# Patient Record
Sex: FEMALE | Race: WHITE | NOT HISPANIC OR LATINO
[De-identification: names, ages, dates, MRNs, and addresses within clinical notes are randomized per-mention and may not be internally consistent; named-entity substitution may affect disease eponyms.]

---

## 2017-01-11 ENCOUNTER — APPOINTMENT (OUTPATIENT)
Dept: UROLOGY | Facility: CLINIC | Age: 67
End: 2017-01-11

## 2017-01-11 VITALS
WEIGHT: 153 LBS | TEMPERATURE: 98.1 F | RESPIRATION RATE: 15 BRPM | BODY MASS INDEX: 25.49 KG/M2 | DIASTOLIC BLOOD PRESSURE: 60 MMHG | HEART RATE: 84 BPM | SYSTOLIC BLOOD PRESSURE: 100 MMHG | HEIGHT: 65 IN

## 2017-02-21 ENCOUNTER — EMERGENCY (EMERGENCY)
Facility: HOSPITAL | Age: 67
LOS: 0 days | Discharge: HOME | End: 2017-02-21
Attending: EMERGENCY MEDICINE | Admitting: EMERGENCY MEDICINE
Payer: COMMERCIAL

## 2017-02-21 VITALS
HEART RATE: 86 BPM | OXYGEN SATURATION: 99 % | TEMPERATURE: 98 F | SYSTOLIC BLOOD PRESSURE: 151 MMHG | RESPIRATION RATE: 18 BRPM | HEIGHT: 65 IN | WEIGHT: 160.06 LBS | DIASTOLIC BLOOD PRESSURE: 72 MMHG

## 2017-02-21 DIAGNOSIS — M54.2 CERVICALGIA: ICD-10-CM

## 2017-02-21 DIAGNOSIS — M25.552 PAIN IN LEFT HIP: ICD-10-CM

## 2017-02-21 DIAGNOSIS — M54.9 DORSALGIA, UNSPECIFIED: ICD-10-CM

## 2017-02-21 PROCEDURE — 99283 EMERGENCY DEPT VISIT LOW MDM: CPT

## 2017-02-21 PROCEDURE — 73502 X-RAY EXAM HIP UNI 2-3 VIEWS: CPT | Mod: 26

## 2017-02-21 RX ORDER — PRAMOXINE HYDROCHLORIDE 150 MG/15G
1 AEROSOL, FOAM RECTAL
Qty: 6 | Refills: 0 | OUTPATIENT
Start: 2017-02-21 | End: 2017-02-22

## 2017-02-21 RX ORDER — CYCLOBENZAPRINE HYDROCHLORIDE 10 MG/1
10 TABLET, FILM COATED ORAL ONCE
Qty: 0 | Refills: 0 | Status: COMPLETED | OUTPATIENT
Start: 2017-02-21 | End: 2017-02-21

## 2017-02-21 RX ORDER — CYCLOBENZAPRINE HYDROCHLORIDE 10 MG/1
1 TABLET, FILM COATED ORAL
Qty: 6 | Refills: 0 | OUTPATIENT
Start: 2017-02-21

## 2017-02-21 RX ORDER — IBUPROFEN 200 MG
600 TABLET ORAL ONCE
Qty: 0 | Refills: 0 | Status: COMPLETED | OUTPATIENT
Start: 2017-02-21 | End: 2017-02-21

## 2017-02-21 RX ADMIN — CYCLOBENZAPRINE HYDROCHLORIDE 10 MILLIGRAM(S): 10 TABLET, FILM COATED ORAL at 10:41

## 2017-02-21 RX ADMIN — Medication 600 MILLIGRAM(S): at 10:41

## 2017-02-21 NOTE — ED STATDOCS - ENMT, MLM
Nasal mucosa clear.  Mouth with normal mucosa  Throat has no vesicles, no oropharyngeal exudates and uvula is midline. No midline c-spine tenderness.
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
## 2017-02-21 NOTE — ED STATDOCS - NS ED MD SCRIBE ATTENDING SCRIBE SECTIONS
DISPOSITION/PROGRESS NOTE/RESULTS/HISTORY OF PRESENT ILLNESS/PAST MEDICAL/SURGICAL/SOCIAL HISTORY/REVIEW OF SYSTEMS/PHYSICAL EXAM

## 2017-02-21 NOTE — ED STATDOCS - DETAILS:
I personally saw the patient with the ACP, and completed the key components of the history and physical exam. I then discussed the management plan with the ACP.

## 2017-02-21 NOTE — ED STATDOCS - PROGRESS NOTE DETAILS
Patient seen and evaluated, L hip pain with FROM, normal gait, no ecchymosis, xray with severe DJD but not fx, has orthopedist, will d/c home with flexeril and PMD follow up. -PARI CumminsC

## 2017-02-21 NOTE — ED STATDOCS - PSH
History of Tonsillectomy    S/P Arthroscopy of Left Knee    S/P Laparoscopic Cholecystectomy    S/P Rhinoplasty

## 2017-02-21 NOTE — ED STATDOCS - MUSCULOSKELETAL, MLM
Left paraspinal tenderness from cervical to lumber. No midline CTL spine tenderness. Lateral left hip TTP, able to stand and bear weight.
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
## 2017-05-17 ENCOUNTER — OUTPATIENT (OUTPATIENT)
Dept: OUTPATIENT SERVICES | Facility: HOSPITAL | Age: 67
LOS: 1 days | Discharge: HOME | End: 2017-05-17
Payer: MEDICARE

## 2017-05-17 VITALS
WEIGHT: 160.06 LBS | HEART RATE: 72 BPM | DIASTOLIC BLOOD PRESSURE: 56 MMHG | HEIGHT: 65 IN | SYSTOLIC BLOOD PRESSURE: 115 MMHG | TEMPERATURE: 98 F | OXYGEN SATURATION: 98 % | RESPIRATION RATE: 20 BRPM

## 2017-05-17 DIAGNOSIS — F32.9 MAJOR DEPRESSIVE DISORDER, SINGLE EPISODE, UNSPECIFIED: ICD-10-CM

## 2017-05-17 DIAGNOSIS — M16.11 UNILATERAL PRIMARY OSTEOARTHRITIS, RIGHT HIP: ICD-10-CM

## 2017-05-17 DIAGNOSIS — M16.12 UNILATERAL PRIMARY OSTEOARTHRITIS, LEFT HIP: ICD-10-CM

## 2017-05-17 DIAGNOSIS — E78.5 HYPERLIPIDEMIA, UNSPECIFIED: ICD-10-CM

## 2017-05-17 DIAGNOSIS — Z96.7 PRESENCE OF OTHER BONE AND TENDON IMPLANTS: Chronic | ICD-10-CM

## 2017-05-17 DIAGNOSIS — I10 ESSENTIAL (PRIMARY) HYPERTENSION: ICD-10-CM

## 2017-05-17 DIAGNOSIS — D62 ACUTE POSTHEMORRHAGIC ANEMIA: ICD-10-CM

## 2017-05-17 DIAGNOSIS — M16.0 BILATERAL PRIMARY OSTEOARTHRITIS OF HIP: ICD-10-CM

## 2017-05-17 DIAGNOSIS — Z01.818 ENCOUNTER FOR OTHER PREPROCEDURAL EXAMINATION: ICD-10-CM

## 2017-05-17 DIAGNOSIS — I95.9 HYPOTENSION, UNSPECIFIED: ICD-10-CM

## 2017-05-17 DIAGNOSIS — I49.5 SICK SINUS SYNDROME: ICD-10-CM

## 2017-05-17 DIAGNOSIS — Z98.890 OTHER SPECIFIED POSTPROCEDURAL STATES: Chronic | ICD-10-CM

## 2017-05-17 LAB
ANION GAP SERPL CALC-SCNC: 8 MMOL/L — SIGNIFICANT CHANGE UP (ref 5–17)
APPEARANCE UR: CLEAR — SIGNIFICANT CHANGE UP
BASOPHILS # BLD AUTO: 0.1 K/UL — SIGNIFICANT CHANGE UP (ref 0–0.2)
BASOPHILS NFR BLD AUTO: 1.2 % — SIGNIFICANT CHANGE UP (ref 0–2)
BILIRUB UR-MCNC: NEGATIVE — SIGNIFICANT CHANGE UP
BLD GP AB SCN SERPL QL: SIGNIFICANT CHANGE UP
BUN SERPL-MCNC: 19 MG/DL — SIGNIFICANT CHANGE UP (ref 7–23)
CALCIUM SERPL-MCNC: 9.3 MG/DL — SIGNIFICANT CHANGE UP (ref 8.5–10.1)
CHLORIDE SERPL-SCNC: 101 MMOL/L — SIGNIFICANT CHANGE UP (ref 96–108)
CO2 SERPL-SCNC: 30 MMOL/L — SIGNIFICANT CHANGE UP (ref 22–31)
COLOR SPEC: YELLOW — SIGNIFICANT CHANGE UP
CREAT SERPL-MCNC: 0.73 MG/DL — SIGNIFICANT CHANGE UP (ref 0.5–1.3)
DIFF PNL FLD: NEGATIVE — SIGNIFICANT CHANGE UP
EOSINOPHIL # BLD AUTO: 0.2 K/UL — SIGNIFICANT CHANGE UP (ref 0–0.5)
EOSINOPHIL NFR BLD AUTO: 3.5 % — SIGNIFICANT CHANGE UP (ref 0–6)
GLUCOSE SERPL-MCNC: 102 MG/DL — HIGH (ref 70–99)
GLUCOSE UR QL: NEGATIVE MG/DL — SIGNIFICANT CHANGE UP
HCT VFR BLD CALC: 32.9 % — LOW (ref 34.5–45)
HGB BLD-MCNC: 10.6 G/DL — LOW (ref 11.5–15.5)
KETONES UR-MCNC: NEGATIVE — SIGNIFICANT CHANGE UP
LEUKOCYTE ESTERASE UR-ACNC: NEGATIVE — SIGNIFICANT CHANGE UP
LYMPHOCYTES # BLD AUTO: 2 K/UL — SIGNIFICANT CHANGE UP (ref 1–3.3)
LYMPHOCYTES # BLD AUTO: 30.3 % — SIGNIFICANT CHANGE UP (ref 13–44)
MANUAL DIF COMMENT BLD-IMP: SIGNIFICANT CHANGE UP
MCHC RBC-ENTMCNC: 31.3 PG — SIGNIFICANT CHANGE UP (ref 27–34)
MCHC RBC-ENTMCNC: 32.2 GM/DL — SIGNIFICANT CHANGE UP (ref 32–36)
MCV RBC AUTO: 97.2 FL — SIGNIFICANT CHANGE UP (ref 80–100)
MONOCYTES # BLD AUTO: 0.6 K/UL — SIGNIFICANT CHANGE UP (ref 0–0.9)
MONOCYTES NFR BLD AUTO: 8.7 % — SIGNIFICANT CHANGE UP (ref 2–14)
MRSA PCR RESULT.: DETECTED
NEUTROPHILS # BLD AUTO: 3.7 K/UL — SIGNIFICANT CHANGE UP (ref 1.8–7.4)
NEUTROPHILS NFR BLD AUTO: 56.3 % — SIGNIFICANT CHANGE UP (ref 43–77)
NITRITE UR-MCNC: NEGATIVE — SIGNIFICANT CHANGE UP
PH UR: 7 — SIGNIFICANT CHANGE UP (ref 5–8)
PLAT MORPH BLD: NORMAL — SIGNIFICANT CHANGE UP
PLATELET # BLD AUTO: 274 K/UL — SIGNIFICANT CHANGE UP (ref 150–400)
POTASSIUM SERPL-MCNC: 4.3 MMOL/L — SIGNIFICANT CHANGE UP (ref 3.5–5.3)
POTASSIUM SERPL-SCNC: 4.3 MMOL/L — SIGNIFICANT CHANGE UP (ref 3.5–5.3)
PROT UR-MCNC: NEGATIVE MG/DL — SIGNIFICANT CHANGE UP
RBC # BLD: 3.38 M/UL — LOW (ref 3.8–5.2)
RBC # FLD: 12 % — SIGNIFICANT CHANGE UP (ref 10.3–14.5)
RBC BLD AUTO: NORMAL — SIGNIFICANT CHANGE UP
S AUREUS DNA NOSE QL NAA+PROBE: DETECTED
SODIUM SERPL-SCNC: 139 MMOL/L — SIGNIFICANT CHANGE UP (ref 135–145)
SP GR SPEC: 1.01 — SIGNIFICANT CHANGE UP (ref 1.01–1.02)
TYPE + AB SCN PNL BLD: SIGNIFICANT CHANGE UP
UROBILINOGEN FLD QL: NEGATIVE MG/DL — SIGNIFICANT CHANGE UP
WBC # BLD: 6.5 K/UL — SIGNIFICANT CHANGE UP (ref 3.8–10.5)
WBC # FLD AUTO: 6.5 K/UL — SIGNIFICANT CHANGE UP (ref 3.8–10.5)

## 2017-05-17 PROCEDURE — 93010 ELECTROCARDIOGRAM REPORT: CPT

## 2017-05-17 PROCEDURE — 71020: CPT | Mod: 26

## 2017-05-17 NOTE — H&P PST ADULT - ASSESSMENT
65 y/o female with osteoarthritis of b/l hips. Complain of severe b/l hip pain. Scheduled for b/l THR with Dr. Knott.    Plan  1. Stop all NSAIDS, herbal supplements and vitamins for 7 days.  2. NPO at midnight.  3. Take the following medications (labetalol, lamotrigine, percocet if needed) with small sips of water on the morning of your procedure/surgery.  4. Use EZ sponges as directed  5. Use mupirocin as directed 67 y/o female with osteoarthritis of both hips. Complain of severe b/l hip pain. Scheduled for b/l THR with Dr. Knott.    Plan  1. Stop all NSAIDS, herbal supplements and vitamins for 7 days.  2. NPO at midnight.  3. Take the following medications (labetalol, lamotrigine, percocet if needed) with small sips of water on the morning of your procedure/surgery.  4. Use EZ sponges as directed  5. Use mupirocin as directed

## 2017-05-17 NOTE — H&P PST ADULT - PMH
Anxiety    Arrhythmia  pacemaker placed in 2011  Bell's palsy    Clinical Depression    Hearing loss  slight  Heart murmur    HTN (hypertension)    Lumbar stenosis    Lyme Disease    Osteoarthritis of both hips    Pacemaker    Sleep apnea  uses mouthpiece Anxiety    Arrhythmia  pacemaker placed in 2011  Bell's palsy    Clinical Depression    Constipation    Hearing loss  slight  Heart murmur    HTN (hypertension)    Lumbar stenosis    Lyme Disease    Osteoarthritis of both hips    Pacemaker    Sleep apnea  uses mouthpiece  Urine retention Anxiety    Arrhythmia  pacemaker placed in 2011  Bell's palsy    Clinical Depression    Constipation    Hearing loss  slight  Heart murmur    HTN (hypertension)    Lumbar stenosis    Lyme Disease    Osteoarthritis of both hips    Pacemaker    Sleep apnea  uses mouthpiece  TBI (traumatic brain injury)  personal history of TBI  Urine retention

## 2017-05-17 NOTE — H&P PST ADULT - PSH
H/O shoulder surgery  fanta procedure - 1986  History of Tonsillectomy  age 22  S/P Arthroscopy of Left Knee  1990's  S/P Laparoscopic Cholecystectomy  15 yrs ago  S/P Rhinoplasty  1970's H/O shoulder surgery  fanta procedure - 1986  History of Tonsillectomy  age 22  S/P Arthroscopy of Left Knee  1990's  S/P Laparoscopic Cholecystectomy  15 yrs ago  S/P ORIF (open reduction internal fixation) fracture  right leg - 2011  S/P Rhinoplasty  1970's

## 2017-05-17 NOTE — H&P PST ADULT - HISTORY OF PRESENT ILLNESS
65 y/o female with osteoarthritis of b/l hips. Complain of severe b/l hip pain. "They're bone on bone. I can't get out of the chair." Takes percocet and tramadol with temporary pain relief. Pt has difficulty walking, doing ADL due to hips pain. Here today for PST for b/l THR. 65 y/o female with osteoarthritis of both hips. Complain of severe b/l hip pain. "They're bone on bone. I can't get out of the chair." Takes percocet and tramadol with temporary pain relief. Pt has difficulty walking, doing ADL due to hips pain. Here today for PST for b/l THR.

## 2017-05-22 ENCOUNTER — OTHER (OUTPATIENT)
Age: 67
End: 2017-05-22

## 2017-05-25 RX ORDER — SODIUM CHLORIDE 9 MG/ML
3 INJECTION INTRAMUSCULAR; INTRAVENOUS; SUBCUTANEOUS EVERY 8 HOURS
Qty: 0 | Refills: 0 | Status: DISCONTINUED | OUTPATIENT
Start: 2017-05-26 | End: 2017-05-30

## 2017-05-25 RX ORDER — FAMOTIDINE 10 MG/ML
20 INJECTION INTRAVENOUS ONCE
Qty: 0 | Refills: 0 | Status: COMPLETED | OUTPATIENT
Start: 2017-05-26 | End: 2017-05-27

## 2017-05-25 RX ORDER — OXYCODONE HYDROCHLORIDE 5 MG/1
10 TABLET ORAL ONCE
Qty: 0 | Refills: 0 | Status: DISCONTINUED | OUTPATIENT
Start: 2017-05-26 | End: 2017-05-26

## 2017-05-25 RX ORDER — ACETAMINOPHEN 500 MG
975 TABLET ORAL ONCE
Qty: 0 | Refills: 0 | Status: COMPLETED | OUTPATIENT
Start: 2017-05-26 | End: 2017-05-26

## 2017-05-25 RX ORDER — CELECOXIB 200 MG/1
200 CAPSULE ORAL ONCE
Qty: 0 | Refills: 0 | Status: COMPLETED | OUTPATIENT
Start: 2017-05-26 | End: 2017-05-26

## 2017-05-26 ENCOUNTER — RESULT REVIEW (OUTPATIENT)
Age: 67
End: 2017-05-26

## 2017-05-26 ENCOUNTER — INPATIENT (INPATIENT)
Facility: HOSPITAL | Age: 67
LOS: 3 days | Discharge: NURSING, REHAB OR LONG-TERM CARE | End: 2017-05-30
Attending: ORTHOPAEDIC SURGERY | Admitting: ORTHOPAEDIC SURGERY
Payer: MEDICARE

## 2017-05-26 VITALS
HEART RATE: 69 BPM | RESPIRATION RATE: 16 BRPM | SYSTOLIC BLOOD PRESSURE: 104 MMHG | TEMPERATURE: 98 F | HEIGHT: 65 IN | WEIGHT: 156.97 LBS | OXYGEN SATURATION: 98 % | DIASTOLIC BLOOD PRESSURE: 54 MMHG

## 2017-05-26 DIAGNOSIS — Z98.890 OTHER SPECIFIED POSTPROCEDURAL STATES: Chronic | ICD-10-CM

## 2017-05-26 DIAGNOSIS — Z96.7 PRESENCE OF OTHER BONE AND TENDON IMPLANTS: Chronic | ICD-10-CM

## 2017-05-26 LAB
ANION GAP SERPL CALC-SCNC: 7 MMOL/L — SIGNIFICANT CHANGE UP (ref 5–17)
APTT BLD: 29 SEC — SIGNIFICANT CHANGE UP (ref 27.5–37.4)
BUN SERPL-MCNC: 20 MG/DL — SIGNIFICANT CHANGE UP (ref 7–23)
CALCIUM SERPL-MCNC: 8.3 MG/DL — LOW (ref 8.5–10.1)
CHLORIDE SERPL-SCNC: 107 MMOL/L — SIGNIFICANT CHANGE UP (ref 96–108)
CO2 SERPL-SCNC: 28 MMOL/L — SIGNIFICANT CHANGE UP (ref 22–31)
CREAT SERPL-MCNC: 0.63 MG/DL — SIGNIFICANT CHANGE UP (ref 0.5–1.3)
GLUCOSE SERPL-MCNC: 111 MG/DL — HIGH (ref 70–99)
HCT VFR BLD CALC: 28.6 % — LOW (ref 34.5–45)
HGB BLD-MCNC: 9.7 G/DL — LOW (ref 11.5–15.5)
INR BLD: 1.04 RATIO — SIGNIFICANT CHANGE UP (ref 0.88–1.16)
INR BLD: 1.07 RATIO — SIGNIFICANT CHANGE UP (ref 0.88–1.16)
MCHC RBC-ENTMCNC: 32.2 PG — SIGNIFICANT CHANGE UP (ref 27–34)
MCHC RBC-ENTMCNC: 33.9 GM/DL — SIGNIFICANT CHANGE UP (ref 32–36)
MCV RBC AUTO: 95 FL — SIGNIFICANT CHANGE UP (ref 80–100)
PLATELET # BLD AUTO: 251 K/UL — SIGNIFICANT CHANGE UP (ref 150–400)
POTASSIUM SERPL-MCNC: 4.1 MMOL/L — SIGNIFICANT CHANGE UP (ref 3.5–5.3)
POTASSIUM SERPL-SCNC: 4.1 MMOL/L — SIGNIFICANT CHANGE UP (ref 3.5–5.3)
PROTHROM AB SERPL-ACNC: 11.2 SEC — SIGNIFICANT CHANGE UP (ref 9.8–12.7)
PROTHROM AB SERPL-ACNC: 11.6 SEC — SIGNIFICANT CHANGE UP (ref 9.8–12.7)
RBC # BLD: 3.01 M/UL — LOW (ref 3.8–5.2)
RBC # FLD: 12 % — SIGNIFICANT CHANGE UP (ref 10.3–14.5)
SODIUM SERPL-SCNC: 142 MMOL/L — SIGNIFICANT CHANGE UP (ref 135–145)
WBC # BLD: 9.4 K/UL — SIGNIFICANT CHANGE UP (ref 3.8–10.5)
WBC # FLD AUTO: 9.4 K/UL — SIGNIFICANT CHANGE UP (ref 3.8–10.5)

## 2017-05-26 PROCEDURE — 74000: CPT | Mod: 26

## 2017-05-26 PROCEDURE — 73522 X-RAY EXAM HIPS BI 3-4 VIEWS: CPT | Mod: 26

## 2017-05-26 PROCEDURE — 99222 1ST HOSP IP/OBS MODERATE 55: CPT

## 2017-05-26 PROCEDURE — 88305 TISSUE EXAM BY PATHOLOGIST: CPT | Mod: 26

## 2017-05-26 RX ORDER — LAMOTRIGINE 25 MG/1
150 TABLET, ORALLY DISINTEGRATING ORAL DAILY
Qty: 0 | Refills: 0 | Status: DISCONTINUED | OUTPATIENT
Start: 2017-05-26 | End: 2017-05-30

## 2017-05-26 RX ORDER — SENNA PLUS 8.6 MG/1
2 TABLET ORAL AT BEDTIME
Qty: 0 | Refills: 0 | Status: DISCONTINUED | OUTPATIENT
Start: 2017-05-26 | End: 2017-05-30

## 2017-05-26 RX ORDER — FERROUS SULFATE 325(65) MG
325 TABLET ORAL
Qty: 0 | Refills: 0 | Status: DISCONTINUED | OUTPATIENT
Start: 2017-05-26 | End: 2017-05-30

## 2017-05-26 RX ORDER — CLONAZEPAM 1 MG
1 TABLET ORAL
Qty: 0 | Refills: 0 | COMMUNITY

## 2017-05-26 RX ORDER — ROPIVACAINE HCL/PF 5 MG/ML
250 AMPUL (ML) INJECTION
Qty: 0 | Refills: 0 | Status: DISCONTINUED | OUTPATIENT
Start: 2017-05-26 | End: 2017-05-29

## 2017-05-26 RX ORDER — ACETAMINOPHEN 500 MG
650 TABLET ORAL EVERY 6 HOURS
Qty: 0 | Refills: 0 | Status: DISCONTINUED | OUTPATIENT
Start: 2017-05-26 | End: 2017-05-30

## 2017-05-26 RX ORDER — LAMOTRIGINE 25 MG/1
1 TABLET, ORALLY DISINTEGRATING ORAL
Qty: 0 | Refills: 0 | COMMUNITY

## 2017-05-26 RX ORDER — HYDROMORPHONE HYDROCHLORIDE 2 MG/ML
0.5 INJECTION INTRAMUSCULAR; INTRAVENOUS; SUBCUTANEOUS EVERY 4 HOURS
Qty: 0 | Refills: 0 | Status: DISCONTINUED | OUTPATIENT
Start: 2017-05-26 | End: 2017-05-30

## 2017-05-26 RX ORDER — ACETAMINOPHEN 500 MG
975 TABLET ORAL EVERY 6 HOURS
Qty: 0 | Refills: 0 | Status: COMPLETED | OUTPATIENT
Start: 2017-05-26 | End: 2017-05-30

## 2017-05-26 RX ORDER — OXYCODONE HYDROCHLORIDE 5 MG/1
5 TABLET ORAL EVERY 4 HOURS
Qty: 0 | Refills: 0 | Status: DISCONTINUED | OUTPATIENT
Start: 2017-05-26 | End: 2017-05-30

## 2017-05-26 RX ORDER — DIPHENHYDRAMINE HCL 50 MG
25 CAPSULE ORAL AT BEDTIME
Qty: 0 | Refills: 0 | Status: DISCONTINUED | OUTPATIENT
Start: 2017-05-26 | End: 2017-05-30

## 2017-05-26 RX ORDER — AMITRIPTYLINE HCL 25 MG
1 TABLET ORAL
Qty: 0 | Refills: 0 | COMMUNITY

## 2017-05-26 RX ORDER — CELECOXIB 200 MG/1
200 CAPSULE ORAL
Qty: 0 | Refills: 0 | Status: COMPLETED | OUTPATIENT
Start: 2017-05-26 | End: 2017-05-30

## 2017-05-26 RX ORDER — QUETIAPINE FUMARATE 200 MG/1
200 TABLET, FILM COATED ORAL
Qty: 0 | Refills: 0 | Status: DISCONTINUED | OUTPATIENT
Start: 2017-05-26 | End: 2017-05-30

## 2017-05-26 RX ORDER — TRAMADOL HYDROCHLORIDE 50 MG/1
1 TABLET ORAL
Qty: 0 | Refills: 0 | COMMUNITY

## 2017-05-26 RX ORDER — PREGABALIN 225 MG/1
1 CAPSULE ORAL
Qty: 0 | Refills: 0 | COMMUNITY

## 2017-05-26 RX ORDER — BETHANECHOL CHLORIDE 25 MG
50 TABLET ORAL THREE TIMES A DAY
Qty: 0 | Refills: 0 | Status: DISCONTINUED | OUTPATIENT
Start: 2017-05-26 | End: 2017-05-30

## 2017-05-26 RX ORDER — ASCORBIC ACID 60 MG
500 TABLET,CHEWABLE ORAL
Qty: 0 | Refills: 0 | Status: DISCONTINUED | OUTPATIENT
Start: 2017-05-26 | End: 2017-05-30

## 2017-05-26 RX ORDER — CEFAZOLIN SODIUM 1 G
2000 VIAL (EA) INJECTION EVERY 6 HOURS
Qty: 0 | Refills: 0 | Status: COMPLETED | OUTPATIENT
Start: 2017-05-26 | End: 2017-05-26

## 2017-05-26 RX ORDER — FENTANYL CITRATE 50 UG/ML
50 INJECTION INTRAVENOUS
Qty: 0 | Refills: 0 | Status: DISCONTINUED | OUTPATIENT
Start: 2017-05-26 | End: 2017-05-26

## 2017-05-26 RX ORDER — MEPERIDINE HYDROCHLORIDE 50 MG/ML
12.5 INJECTION INTRAMUSCULAR; INTRAVENOUS; SUBCUTANEOUS
Qty: 0 | Refills: 0 | Status: DISCONTINUED | OUTPATIENT
Start: 2017-05-26 | End: 2017-05-26

## 2017-05-26 RX ORDER — SODIUM CHLORIDE 9 MG/ML
1000 INJECTION INTRAMUSCULAR; INTRAVENOUS; SUBCUTANEOUS
Qty: 0 | Refills: 0 | Status: DISCONTINUED | OUTPATIENT
Start: 2017-05-26 | End: 2017-05-30

## 2017-05-26 RX ORDER — MULTIVIT-MIN/FERROUS GLUCONATE 9 MG/15 ML
1 LIQUID (ML) ORAL
Qty: 0 | Refills: 0 | COMMUNITY

## 2017-05-26 RX ORDER — FOLIC ACID 0.8 MG
1 TABLET ORAL DAILY
Qty: 0 | Refills: 0 | Status: DISCONTINUED | OUTPATIENT
Start: 2017-05-26 | End: 2017-05-30

## 2017-05-26 RX ORDER — PANTOPRAZOLE SODIUM 20 MG/1
40 TABLET, DELAYED RELEASE ORAL DAILY
Qty: 0 | Refills: 0 | Status: DISCONTINUED | OUTPATIENT
Start: 2017-05-26 | End: 2017-05-30

## 2017-05-26 RX ORDER — SODIUM CHLORIDE 9 MG/ML
1000 INJECTION, SOLUTION INTRAVENOUS
Qty: 0 | Refills: 0 | Status: DISCONTINUED | OUTPATIENT
Start: 2017-05-26 | End: 2017-05-26

## 2017-05-26 RX ORDER — ONDANSETRON 8 MG/1
4 TABLET, FILM COATED ORAL EVERY 6 HOURS
Qty: 0 | Refills: 0 | Status: DISCONTINUED | OUTPATIENT
Start: 2017-05-26 | End: 2017-05-30

## 2017-05-26 RX ORDER — VITAMIN E 100 UNIT
1 CAPSULE ORAL
Qty: 0 | Refills: 0 | COMMUNITY

## 2017-05-26 RX ORDER — BETHANECHOL CHLORIDE 25 MG
1 TABLET ORAL
Qty: 0 | Refills: 0 | COMMUNITY

## 2017-05-26 RX ORDER — LABETALOL HCL 100 MG
0.5 TABLET ORAL
Qty: 0 | Refills: 0 | COMMUNITY

## 2017-05-26 RX ORDER — OXYCODONE HYDROCHLORIDE 5 MG/1
10 TABLET ORAL EVERY 4 HOURS
Qty: 0 | Refills: 0 | Status: DISCONTINUED | OUTPATIENT
Start: 2017-05-26 | End: 2017-05-30

## 2017-05-26 RX ORDER — POLYETHYLENE GLYCOL 3350 17 G/17G
0 POWDER, FOR SOLUTION ORAL
Qty: 0 | Refills: 0 | COMMUNITY

## 2017-05-26 RX ORDER — CLONAZEPAM 1 MG
2 TABLET ORAL AT BEDTIME
Qty: 0 | Refills: 0 | Status: DISCONTINUED | OUTPATIENT
Start: 2017-05-26 | End: 2017-05-30

## 2017-05-26 RX ORDER — LABETALOL HCL 100 MG
100 TABLET ORAL DAILY
Qty: 0 | Refills: 0 | Status: DISCONTINUED | OUTPATIENT
Start: 2017-05-26 | End: 2017-05-30

## 2017-05-26 RX ORDER — LABETALOL HCL 100 MG
1 TABLET ORAL
Qty: 0 | Refills: 0 | COMMUNITY

## 2017-05-26 RX ORDER — SENNA PLUS 8.6 MG/1
2 TABLET ORAL
Qty: 0 | Refills: 0 | COMMUNITY

## 2017-05-26 RX ORDER — DIPHENHYDRAMINE HCL 50 MG
25 CAPSULE ORAL EVERY 4 HOURS
Qty: 0 | Refills: 0 | Status: DISCONTINUED | OUTPATIENT
Start: 2017-05-26 | End: 2017-05-30

## 2017-05-26 RX ORDER — HEPARIN SODIUM 5000 [USP'U]/ML
5000 INJECTION INTRAVENOUS; SUBCUTANEOUS EVERY 8 HOURS
Qty: 0 | Refills: 0 | Status: DISCONTINUED | OUTPATIENT
Start: 2017-05-26 | End: 2017-05-29

## 2017-05-26 RX ORDER — ONDANSETRON 8 MG/1
4 TABLET, FILM COATED ORAL EVERY 6 HOURS
Qty: 0 | Refills: 0 | Status: DISCONTINUED | OUTPATIENT
Start: 2017-05-26 | End: 2017-05-26

## 2017-05-26 RX ORDER — QUETIAPINE FUMARATE 200 MG/1
2 TABLET, FILM COATED ORAL
Qty: 0 | Refills: 0 | COMMUNITY

## 2017-05-26 RX ORDER — HYDROMORPHONE HYDROCHLORIDE 2 MG/ML
0.5 INJECTION INTRAMUSCULAR; INTRAVENOUS; SUBCUTANEOUS
Qty: 0 | Refills: 0 | Status: DISCONTINUED | OUTPATIENT
Start: 2017-05-26 | End: 2017-05-30

## 2017-05-26 RX ORDER — DOCUSATE SODIUM 100 MG
100 CAPSULE ORAL EVERY 12 HOURS
Qty: 0 | Refills: 0 | Status: DISCONTINUED | OUTPATIENT
Start: 2017-05-26 | End: 2017-05-30

## 2017-05-26 RX ORDER — POLYETHYLENE GLYCOL 3350 17 G/17G
17 POWDER, FOR SOLUTION ORAL DAILY
Qty: 0 | Refills: 0 | Status: DISCONTINUED | OUTPATIENT
Start: 2017-05-26 | End: 2017-05-30

## 2017-05-26 RX ORDER — ACETAMINOPHEN 500 MG
1000 TABLET ORAL ONCE
Qty: 0 | Refills: 0 | Status: COMPLETED | OUTPATIENT
Start: 2017-05-26 | End: 2017-05-26

## 2017-05-26 RX ORDER — WARFARIN SODIUM 2.5 MG/1
5 TABLET ORAL DAILY
Qty: 0 | Refills: 0 | Status: DISCONTINUED | OUTPATIENT
Start: 2017-05-26 | End: 2017-05-28

## 2017-05-26 RX ORDER — ONDANSETRON 8 MG/1
4 TABLET, FILM COATED ORAL ONCE
Qty: 0 | Refills: 0 | Status: DISCONTINUED | OUTPATIENT
Start: 2017-05-26 | End: 2017-05-26

## 2017-05-26 RX ORDER — OXYCODONE HYDROCHLORIDE 5 MG/1
10 TABLET ORAL EVERY 12 HOURS
Qty: 0 | Refills: 0 | Status: DISCONTINUED | OUTPATIENT
Start: 2017-05-26 | End: 2017-05-30

## 2017-05-26 RX ORDER — AMITRIPTYLINE HCL 25 MG
75 TABLET ORAL AT BEDTIME
Qty: 0 | Refills: 0 | Status: DISCONTINUED | OUTPATIENT
Start: 2017-05-26 | End: 2017-05-30

## 2017-05-26 RX ADMIN — CELECOXIB 200 MILLIGRAM(S): 200 CAPSULE ORAL at 07:36

## 2017-05-26 RX ADMIN — Medication 20 MILLIGRAM(S): at 22:00

## 2017-05-26 RX ADMIN — Medication 2 MILLIGRAM(S): at 22:00

## 2017-05-26 RX ADMIN — Medication 1000 MILLIGRAM(S): at 17:35

## 2017-05-26 RX ADMIN — OXYCODONE HYDROCHLORIDE 5 MILLIGRAM(S): 5 TABLET ORAL at 23:38

## 2017-05-26 RX ADMIN — Medication 50 MILLIGRAM(S): at 22:01

## 2017-05-26 RX ADMIN — OXYCODONE HYDROCHLORIDE 10 MILLIGRAM(S): 5 TABLET ORAL at 07:36

## 2017-05-26 RX ADMIN — Medication 100 MILLIGRAM(S): at 20:34

## 2017-05-26 RX ADMIN — Medication 400 MILLIGRAM(S): at 17:10

## 2017-05-26 RX ADMIN — WARFARIN SODIUM 5 MILLIGRAM(S): 2.5 TABLET ORAL at 22:00

## 2017-05-26 RX ADMIN — Medication 975 MILLIGRAM(S): at 07:36

## 2017-05-26 RX ADMIN — SODIUM CHLORIDE 3 MILLILITER(S): 9 INJECTION INTRAMUSCULAR; INTRAVENOUS; SUBCUTANEOUS at 22:05

## 2017-05-26 RX ADMIN — PANTOPRAZOLE SODIUM 40 MILLIGRAM(S): 20 TABLET, DELAYED RELEASE ORAL at 17:28

## 2017-05-26 RX ADMIN — Medication 0.25 MILLIGRAM(S): at 12:08

## 2017-05-26 RX ADMIN — Medication 100 MILLIGRAM(S): at 14:29

## 2017-05-26 RX ADMIN — Medication 250 MILLILITER(S): at 14:20

## 2017-05-26 RX ADMIN — Medication 250 MILLILITER(S): at 14:21

## 2017-05-26 RX ADMIN — Medication 250 MILLILITER(S): at 14:29

## 2017-05-26 RX ADMIN — Medication 75 MILLIGRAM(S): at 22:01

## 2017-05-26 RX ADMIN — SODIUM CHLORIDE 100 MILLILITER(S): 9 INJECTION, SOLUTION INTRAVENOUS at 12:00

## 2017-05-26 RX ADMIN — OXYCODONE HYDROCHLORIDE 10 MILLIGRAM(S): 5 TABLET ORAL at 22:00

## 2017-05-26 RX ADMIN — OXYCODONE HYDROCHLORIDE 10 MILLIGRAM(S): 5 TABLET ORAL at 14:23

## 2017-05-26 RX ADMIN — HEPARIN SODIUM 5000 UNIT(S): 5000 INJECTION INTRAVENOUS; SUBCUTANEOUS at 22:01

## 2017-05-26 RX ADMIN — Medication 100 MILLIGRAM(S): at 17:28

## 2017-05-26 NOTE — CONSULT NOTE ADULT - SUBJECTIVE AND OBJECTIVE BOX
HPI:      Patient is a 66y old  Female who presents with a chief complaint of inc B/L hip pain, OA, now S/P  b/l hip replacements (26 May 2017 11:55)  H/O arrythmia, no AC, on Labetolol, has PPM    Consulted by Dr. Knott    for VTE prophylaxis, risk stratification, and anticoagulation management.    PAST MEDICAL & SURGICAL HISTORY:  TBI (traumatic brain injury): personal history of TBI  Constipation  Urine retention  Arrhythmia: pacemaker placed in 2011  Pacemaker  Sleep apnea: uses mouthpiece  Lumbar stenosis  Anxiety  HTN (hypertension)  Heart murmur  Surprise palsy  Hearing loss: slight  Osteoarthritis of both hips  Lyme Disease  Clinical Depression  S/P ORIF (open reduction internal fixation) fracture: right leg - 2011  H/O shoulder surgery: fanta procedure - 1986  S/P Laparoscopic Cholecystectomy: 15 yrs ago  S/P Arthroscopy of Left Knee: 1990&#x27;s  S/P Rhinoplasty: 1970&#x27;s  History of Tonsillectomy: age 22          CrCl: 100.8    Caprini VTE Risk Score: #8    IMPROVE Bleeding Risk Score #1.5    Falls Risk:   High (X  )  Mod (  )  Low (  )      FAMILY HISTORY:  No pertinent family history in first degree relatives    Denies any personal or familial history of clotting or bleeding disorders.    Allergies    erythromycin (Rash)    Intolerances        REVIEW OF SYSTEMS    (  )Fever	     (  )Constipation	(  )SOB				(  )Headache	(  )Dysuria  (  )Chills	     (  )Melena	(  )Dyspnea present on exertion	                    (  )Dizziness                    (  )Polyuria  (  )Nausea	     (  )Hematochezia	(  )Cough			                    (  )Syncope   	(  )Hematuria  (  )Vomiting    (  )Chest Pain	(  )Wheezing			(  )Weakness  (  )Diarrhea     (  )Palpitations	(  )Anorexia			(  )Myalgia    All other review of systems negative: Yes          PHYSICAL EXAM:    Constitutional: Appears Well    Neurological: A& O x 3    Skin: Warm    Respiratory and Thorax: normal effort; Breath sounds: normal; No rales/wheezing/rhonchi  	  Cardiovascular: S1, S2, regular, NMBR	    Gastrointestinal: BS + x 4Q, nontender	    Genitourinary:  Bladder nondistended, nontender    Musculoskeletal:   General Right:   + muscle/joint tenderness,   normal tone, no joint swelling,   ROM: limited	    General Left:   + muscle/joint tenderness,   normal tone, no joint swelling,   ROM: limited    Hip:  Right: Dressing CDI; Drain: hemovac ; Type of drng.: serosang.; Amt. of drng: small            Left: Dressing CDI; Drain: hemovac ; Type of drng.: serosang.; Amt. of drng: small          Lower extrems:   Right: no calf tenderness              negative julian's sign               + pedal pulses    Left:   no calf tenderness              negative julian's sign               + pedal pulses                          9.7    9.4   )-----------( 251      ( 26 May 2017 12:43 )             28.6       05-26    142  |  107  |  20  ----------------------------<  111<H>  4.1   |  28  |  0.63    Ca    8.3<L>      26 May 2017 12:43        PT/INR - ( 26 May 2017 12:43 )   PT: 11.6 sec;   INR: 1.07 ratio         PTT - ( 26 May 2017 12:43 )  PTT:29.0 sec				    MEDICATIONS  (STANDING):  lactated ringers. 1000milliLiter(s) IV Continuous <Continuous>  acetaminophen   Tablet 975milliGRAM(s) Oral every 6 hours  oxyCODONE ER Tablet 10milliGRAM(s) Oral every 12 hours  celecoxib 200milliGRAM(s) Oral with breakfast  docusate sodium 100milliGRAM(s) Oral every 12 hours  ropivacaine 0.2% in 0.9% Sodium Chloride PCRA 250milliLiter(s) Regional Catheter PCA Continuous  ropivacaine 0.2% in 0.9% Sodium Chloride PCRA 250milliLiter(s) Regional Catheter PCA Continuous  ceFAZolin   IVPB 2000milliGRAM(s) IV Intermittent every 6 hours          DVT Prophylaxis:  LMWH                   (  )  Heparin SQ           (  )  Coumadin             (x  )  Xarelto                  (  )  Eliquis                   (  )  Venodynes           (  x)  Ambulation          ( x )  UFH                       (  )  Contraindicated  (  )

## 2017-05-26 NOTE — DISCHARGE NOTE ADULT - CARE PLAN
Principal Discharge DX:	Osteoarthritis of both hips  Goal:	Return to ADLs  Instructions for follow-up, activity and diet:	1.	Pain Control  2.	Walking with full weight bearing as tolerated, with assistive devices (walker/Cane as Needed)  3.	DVT Prophylaxis per anticoagulation team recommendations  4.	PT as needed  5.	Follow up with Dr. Knott as Outpatient in 10-14 Days after Discharge from the Hospital or Rehab. Call Office For Appointment.   6.	Remove Staples Post-Op Day 14. Remove old and place new Aquacel  Bandage Q7days until staples removed.   7.	Ice plenty  8.	OK to shower as long as Aquacel Bandage is used for wound. Principal Discharge DX:	Osteoarthritis of both hips  Goal:	Return to ADLs  Instructions for follow-up, activity and diet:	Discharge Instructions Total Hip Arthroplasty    1. Diet: Resume previous diet    2. Activity: WBAT. Rolling walker. Posterior Hip Dislocation Precautions. Abduction Pillow while in bed and Chair. Daily Physical Therapy.    3. Call with: fever over 101, wound redness, drainage or open area, calf pain/calf swelling.    4. Wound Care: Remove old and Place new Aquacel bandage to hip wound every 7days. Remove Staples Post Op Day #14 (6/9/17) so long as wound is healed, no drainage or open area. OK to Shower with Aquacel.  Avoid direct water beating on bandage.     5. DVT PE Prophylaxis: Aspirin  BID x 30days OR Coumadin dosed to goal INR 2-3. Stop Lovenox or Heparin when INR>2. See Anticoagulation Instructions. See Med Rec.Check INR Daily until levels stable if on Coumadin.    6. Labs: Check H&H weekly while on Anticoagulation    7. Follow Up: Orthopedics, 10-14 days in office. Call to schedule. If going home, eRX sent to your pharmacy for . Principal Discharge DX:	Osteoarthritis of both hips  Goal:	Return to ADLs  Instructions for follow-up, activity and diet:	Discharge Instructions Total Hip Arthroplasty    1. Diet: Resume previous diet    2. Activity: WBAT. Rolling walker. Posterior Hip Dislocation Precautions. Abduction Pillow while in bed and Chair. Daily Physical Therapy.    3. Call with: fever over 101, wound redness, drainage or open area, calf pain/calf swelling.    4. Wound Care: Remove old and Place new Aquacel bandage to hip wound every 7days. Remove Staples Post Op Day #14 (6/9/17) so long as wound is healed, no drainage or open area. OK to Shower with Aquacel.  Avoid direct water beating on bandage.     5. DVT PE Prophylaxis: Coumadin dosed to goal INR 2-3. Check INR Daily until levels stable if on Coumadin.    6. Labs: Check H&H weekly while on Anticoagulation    7. Follow Up: Orthopedics, 10-14 days in office. Call to schedule.

## 2017-05-26 NOTE — PROCEDURE NOTE - ADDITIONAL PROCEDURE DETAILS
Patient awake and alert. Sitting position. Needle inserted approximately 4 cm left lateral to midline at approximately L4 level. Transverse process contacted. Needle stepped off cephalad. Loss of resistance. No parasthesias. No Heme. Will confirm position with Xray. No complications.  While still in the sitting position. Needle inserted approximately 4 cm right lateral to midline at approximately L4 level. Transverse process contacted. Needle stepped off cephalad. Loss of resistance. No parasthesias. No Heme. Will confirm position with Xray. No complications.

## 2017-05-26 NOTE — DISCHARGE NOTE ADULT - MEDICATION SUMMARY - MEDICATIONS TO TAKE
I will START or STAY ON the medications listed below when I get home from the hospital:    lamoTRIgine 150 mg oral tablet  -- 1 tab(s) by mouth once a day  -- Indication: For prior med    clonazePAM 2 mg oral tablet  -- 1 tab(s) by mouth once a day (at bedtime)  -- Indication: For prior med    Paxil CR 25 mg oral tablet, extended release  -- 1 tab(s) by mouth once a day (at bedtime)  -- Indication: For prior med    amitriptyline 75 mg oral tablet  -- 1 tab(s) by mouth once a day (at bedtime)  -- Indication: For prior med    QUEtiapine 200 mg oral tablet  -- 2 tab(s) by mouth once a day  -- Indication: For prior med    labetalol 100 mg oral tablet  -- 1 tab(s) by mouth once a day  -- Indication: For prior med    labetalol 100 mg oral tablet  -- 0.5 tab(s) by mouth once a day (at bedtime)  -- Indication: For prior med    senna oral tablet  -- 2 tab(s) by mouth once a day  -- Indication: For prior med    MiraLax oral powder for reconstitution  --  by mouth once a day  -- Indication: For prior med    bethanechol 50 mg oral tablet  -- 1 tab(s) by mouth 3 times a day  -- Indication: For prior med    Calcium 600+D oral tablet  -- 2 tab(s) by mouth once a day  -- Indication: For prior med    Centrum Silver oral tablet  -- 1 tab(s) by mouth once a day  -- Indication: For prior med    Vitamin B12 1000 mcg oral tablet  -- 1 tab(s) by mouth once a day  -- Indication: For prior med    vitamin E 400 intl units oral capsule  -- 1 cap(s) by mouth once a day  -- Indication: For prior med I will START or STAY ON the medications listed below when I get home from the hospital:    warfarin  -- 0.5 milligram(s) by mouth once a day until INR 2-3, titrate as needed  -- Indication: For blood clot prevention    clonazePAM 2 mg oral tablet  -- 1 tab(s) by mouth once a day (at bedtime)  -- Indication: For prior med    lamoTRIgine 150 mg oral tablet  -- 1 tab(s) by mouth once a day  -- Indication: For prior med    amitriptyline 75 mg oral tablet  -- 1 tab(s) by mouth once a day (at bedtime)  -- Indication: For prior med    Paxil CR 25 mg oral tablet, extended release  -- 1 tab(s) by mouth once a day (at bedtime)  -- Indication: For prior med    QUEtiapine 200 mg oral tablet  -- 2 tab(s) by mouth once a day  -- Indication: For prior med    labetalol 100 mg oral tablet  -- 1 tab(s) by mouth once a day  -- Indication: For prior med    labetalol 100 mg oral tablet  -- 0.5 tab(s) by mouth once a day (at bedtime)  -- Indication: For prior med    senna oral tablet  -- 2 tab(s) by mouth once a day  -- Indication: For prior med    Colace 100 mg oral capsule  -- 1 cap(s) by mouth 3 times a day for constipation  -- Indication: For constipation    MiraLax oral powder for reconstitution  --  by mouth once a day  -- Indication: For prior med    bethanechol 50 mg oral tablet  -- 1 tab(s) by mouth 3 times a day  -- Indication: For prior med    Calcium 600+D oral tablet  -- 2 tab(s) by mouth once a day  -- Indication: For prior med    Centrum Silver oral tablet  -- 1 tab(s) by mouth once a day  -- Indication: For prior med    Vitamin B12 1000 mcg oral tablet  -- 1 tab(s) by mouth once a day  -- Indication: For prior med    vitamin E 400 intl units oral capsule  -- 1 cap(s) by mouth once a day  -- Indication: For prior med I will START or STAY ON the medications listed below when I get home from the hospital:    Percocet 5/325 oral tablet  -- 1 tab(s) by mouth every 4 hours, As Needed for pain  -- Indication: For pain    warfarin  -- 0.5 milligram(s) by mouth once a day until INR 2-3, titrate as needed  -- Indication: For blood clot prevention    clonazePAM 2 mg oral tablet  -- 1 tab(s) by mouth once a day (at bedtime)  -- Indication: For prior med    lamoTRIgine 150 mg oral tablet  -- 1 tab(s) by mouth once a day  -- Indication: For prior med    amitriptyline 75 mg oral tablet  -- 1 tab(s) by mouth once a day (at bedtime)  -- Indication: For prior med    Paxil CR 25 mg oral tablet, extended release  -- 1 tab(s) by mouth once a day (at bedtime)  -- Indication: For prior med    QUEtiapine 200 mg oral tablet  -- 2 tab(s) by mouth once a day  -- Indication: For prior med    labetalol 100 mg oral tablet  -- 1 tab(s) by mouth once a day  -- Indication: For prior med    labetalol 100 mg oral tablet  -- 0.5 tab(s) by mouth once a day (at bedtime)  -- Indication: For prior med    senna oral tablet  -- 2 tab(s) by mouth once a day  -- Indication: For prior med    Colace 100 mg oral capsule  -- 1 cap(s) by mouth 3 times a day for constipation  -- Indication: For constipation    MiraLax oral powder for reconstitution  --  by mouth once a day  -- Indication: For prior med    bethanechol 50 mg oral tablet  -- 1 tab(s) by mouth 3 times a day  -- Indication: For prior med    Calcium 600+D oral tablet  -- 2 tab(s) by mouth once a day  -- Indication: For prior med    Centrum Silver oral tablet  -- 1 tab(s) by mouth once a day  -- Indication: For prior med    Vitamin B12 1000 mcg oral tablet  -- 1 tab(s) by mouth once a day  -- Indication: For prior med    vitamin E 400 intl units oral capsule  -- 1 cap(s) by mouth once a day  -- Indication: For prior med I will START or STAY ON the medications listed below when I get home from the hospital:    Percocet 5/325 oral tablet  -- 1 tab(s) by mouth every 4 hours, As Needed for pain  -- Indication: For pain    warfarin  -- 0.5 milligram(s) by mouth once a day until INR 2-3, titrate as needed to goal INR 2-3.  -- Indication: For blood clot prevention    clonazePAM 2 mg oral tablet  -- 1 tab(s) by mouth once a day (at bedtime)  -- Indication: For prior med    lamoTRIgine 150 mg oral tablet  -- 1 tab(s) by mouth once a day  -- Indication: For prior med    amitriptyline 75 mg oral tablet  -- 1 tab(s) by mouth once a day (at bedtime)  -- Indication: For prior med    Paxil CR 25 mg oral tablet, extended release  -- 1 tab(s) by mouth once a day (at bedtime)  -- Indication: For prior med    QUEtiapine 200 mg oral tablet  -- 2 tab(s) by mouth once a day  -- Indication: For prior med    labetalol 100 mg oral tablet  -- 1 tab(s) by mouth once a day  -- Indication: For prior med    labetalol 100 mg oral tablet  -- 0.5 tab(s) by mouth once a day (at bedtime)  -- Indication: For prior med    senna oral tablet  -- 2 tab(s) by mouth once a day  -- Indication: For prior med    Colace 100 mg oral capsule  -- 1 cap(s) by mouth 3 times a day for constipation  -- Indication: For constipation    MiraLax oral powder for reconstitution  --  by mouth once a day  -- Indication: For prior med    bethanechol 50 mg oral tablet  -- 1 tab(s) by mouth 3 times a day  -- Indication: For prior med    Calcium 600+D oral tablet  -- 2 tab(s) by mouth once a day  -- Indication: For prior med    Centrum Silver oral tablet  -- 1 tab(s) by mouth once a day  -- Indication: For prior med    Vitamin B12 1000 mcg oral tablet  -- 1 tab(s) by mouth once a day  -- Indication: For prior med    vitamin E 400 intl units oral capsule  -- 1 cap(s) by mouth once a day  -- Indication: For prior med

## 2017-05-26 NOTE — DISCHARGE NOTE ADULT - PLAN OF CARE
Return to ADLs 1.	Pain Control  2.	Walking with full weight bearing as tolerated, with assistive devices (walker/Cane as Needed)  3.	DVT Prophylaxis per anticoagulation team recommendations  4.	PT as needed  5.	Follow up with Dr. Knott as Outpatient in 10-14 Days after Discharge from the Hospital or Rehab. Call Office For Appointment.   6.	Remove Staples Post-Op Day 14. Remove old and place new Aquacel  Bandage Q7days until staples removed.   7.	Ice plenty  8.	OK to shower as long as Aquacel Bandage is used for wound. Discharge Instructions Total Hip Arthroplasty    1. Diet: Resume previous diet    2. Activity: WBAT. Rolling walker. Posterior Hip Dislocation Precautions. Abduction Pillow while in bed and Chair. Daily Physical Therapy.    3. Call with: fever over 101, wound redness, drainage or open area, calf pain/calf swelling.    4. Wound Care: Remove old and Place new Aquacel bandage to hip wound every 7days. Remove Staples Post Op Day #14 (6/9/17) so long as wound is healed, no drainage or open area. OK to Shower with Aquacel.  Avoid direct water beating on bandage.     5. DVT PE Prophylaxis: Aspirin  BID x 30days OR Coumadin dosed to goal INR 2-3. Stop Lovenox or Heparin when INR>2. See Anticoagulation Instructions. See Med Rec.Check INR Daily until levels stable if on Coumadin.    6. Labs: Check H&H weekly while on Anticoagulation    7. Follow Up: Orthopedics, 10-14 days in office. Call to schedule. If going home, eRX sent to your pharmacy for . Discharge Instructions Total Hip Arthroplasty    1. Diet: Resume previous diet    2. Activity: WBAT. Rolling walker. Posterior Hip Dislocation Precautions. Abduction Pillow while in bed and Chair. Daily Physical Therapy.    3. Call with: fever over 101, wound redness, drainage or open area, calf pain/calf swelling.    4. Wound Care: Remove old and Place new Aquacel bandage to hip wound every 7days. Remove Staples Post Op Day #14 (6/9/17) so long as wound is healed, no drainage or open area. OK to Shower with Aquacel.  Avoid direct water beating on bandage.     5. DVT PE Prophylaxis: Coumadin dosed to goal INR 2-3. Check INR Daily until levels stable if on Coumadin.    6. Labs: Check H&H weekly while on Anticoagulation    7. Follow Up: Orthopedics, 10-14 days in office. Call to schedule.

## 2017-05-26 NOTE — CONSULT NOTE ADULT - SUBJECTIVE AND OBJECTIVE BOX
CC- s/p b/l THR    HPI:  67yo/F with PMH anxiety, SSS s/p PPM, depression, OA presented for elective b/l THR. Medical consult called for postop medical management    PMH- as above  PSH- lap jeana, shoulder surgery, tonsillectomy, ORIF RT leg  Soc hx- denies smoking, lives at home  Fam hx- non-contributory    Review of system- All 10 systems reviewed and is as per HPI otherwise negative.     T(C): 36.8, Max: 36.8 (05-26 @ 16:03)  HR: 78 (65 - 84)  BP: 110/52 (97/42 - 129/65)  RR: 18 (12 - 18)  SpO2: 100% (92% - 100%)  Wt(kg): --    LABS:                        9.7    9.4   )-----------( 251      ( 26 May 2017 12:43 )             28.6     05-26    142  |  107  |  20  ----------------------------<  111<H>  4.1   |  28  |  0.63    Ca    8.3<L>      26 May 2017 12:43  PT/INR - ( 26 May 2017 12:43 )   PT: 11.6 sec;   INR: 1.07 ratio    PTT - ( 26 May 2017 12:43 )  PTT:29.0 sec    PHYSICAL EXAM:  GENERAL: NAD, well-groomed, well-developed  HEAD:  Atraumatic, Normocephalic  EYES: EOMI, PERRLA, conjunctiva and sclera clear  HEENT: Moist mucous membranes  NECK: Supple, No JVD  NERVOUS SYSTEM:  Alert & Oriented X3, Motor Strength 5/5 B/L upper and lower extremities; DTRs 2+ intact and symmetric  CHEST/LUNG: Clear to auscultation bilaterally; No rales, rhonchi, wheezing, or rubs  HEART: Regular rate and rhythm; No murmurs, rubs, or gallops  ABDOMEN: Soft, Nontender, Nondistended; Bowel sounds present  GENITOURINARY- Voiding, no palpable bladder  EXTREMITIES:  2+ Peripheral Pulses, No clubbing, cyanosis, or edema  MUSCULOSKELTAL- b/l hip dressing dry, +hemovacs  SKIN-no rash, no lesion  CNS- alert, oriented X3, non focal     Daily Height in cm: 165.1 (26 May 2017 07:09)         sodium chloride 0.9%. 1000milliLiter(s) IV Continuous <Continuous>  acetaminophen   Tablet 650milliGRAM(s) Oral every 6 hours PRN  acetaminophen   Tablet. 650milliGRAM(s) Oral every 6 hours PRN  oxyCODONE IR 5milliGRAM(s) Oral every 4 hours PRN  oxyCODONE IR 10milliGRAM(s) Oral every 4 hours PRN  HYDROmorphone  Injectable 0.5milliGRAM(s) IV Push every 4 hours PRN  aluminum hydroxide/magnesium hydroxide/simethicone Suspension 30milliLiter(s) Oral four times a day PRN  ondansetron Injectable 4milliGRAM(s) IV Push every 6 hours PRN  calcium carbonate 1250 mG + Vitamin D (OsCal 500 + D) 1Tablet(s) Oral three times a day  pantoprazole    Tablet 40milliGRAM(s) Oral daily  diphenhydrAMINE   Capsule 25milliGRAM(s) Oral at bedtime PRN  polyethylene glycol 3350 17Gram(s) Oral daily  senna 2Tablet(s) Oral at bedtime PRN  ferrous    sulfate 325milliGRAM(s) Oral three times a day with meals  folic acid 1milliGRAM(s) Oral daily  multivitamin 1Tablet(s) Oral daily  ascorbic acid 500milliGRAM(s) Oral two times a day  acetaminophen   Tablet 975milliGRAM(s) Oral every 6 hours  oxyCODONE ER Tablet 10milliGRAM(s) Oral every 12 hours  celecoxib 200milliGRAM(s) Oral with breakfast  oxyCODONE IR 5milliGRAM(s) Oral every 4 hours PRN  oxyCODONE IR 10milliGRAM(s) Oral every 4 hours PRN  HYDROmorphone  Injectable 0.5milliGRAM(s) IV Push every 3 hours PRN  ondansetron Injectable 4milliGRAM(s) IV Push every 6 hours PRN  docusate sodium 100milliGRAM(s) Oral every 12 hours  ropivacaine 0.2% in 0.9% Sodium Chloride PCRA 250milliLiter(s) Regional Catheter PCA Continuous  diphenhydrAMINE   Injectable 25milliGRAM(s) IV Push every 4 hours PRN  ropivacaine 0.2% in 0.9% Sodium Chloride PCRA 250milliLiter(s) Regional Catheter PCA Continuous  ceFAZolin   IVPB 2000milliGRAM(s) IV Intermittent every 6 hours  warfarin 5milliGRAM(s) Oral daily  heparin  Injectable 5000Unit(s) SubCutaneous every 8 hours    Assessment/Plan  #S/p b/l THR  Ortho f/u appreciated  Pain meds prn  PT as tolerated  AC by Heparin+coumadin  Monitor HH  Bowel regimen  Incentive spirometry    #Anxiety/depression- stable    #Dispo- thank you for consult, will follow with you

## 2017-05-26 NOTE — PATIENT PROFILE ADULT. - PSH
H/O shoulder surgery  fanta procedure - 1986  History of Tonsillectomy  age 22  S/P Arthroscopy of Left Knee  1990's  S/P Laparoscopic Cholecystectomy  15 yrs ago  S/P ORIF (open reduction internal fixation) fracture  right leg - 2011  S/P Rhinoplasty  1970's

## 2017-05-26 NOTE — PROGRESS NOTE ADULT - SUBJECTIVE AND OBJECTIVE BOX
Pt S&E. Pain controlled. Resting comfortably    Vital Signs Last 24 Hrs  T(C): 36.2, Max: 36.7 (05-26 @ 07:09)  T(F): 97.2, Max: 98.1 (05-26 @ 07:09)  HR: 67 (65 - 84)  BP: 117/58 (97/42 - 129/65)  BP(mean): --  RR: 14 (12 - 16)  SpO2: 99% (92% - 100%)    26 May 2017 12:43    142    |  107    |  20     ----------------------------<  111    4.1     |  28     |  0.63     Ca    8.3        26 May 2017 12:43                          9.7    9.4   )-----------( 251      ( 26 May 2017 12:43 )             28.6       Gen: NAD  B/L LE:  Dsg c/d/i  +HMV  SILT L2-S1  +EHL/FHL/TA/Gastroc  DP+  Soft compartments, - calf ttp

## 2017-05-26 NOTE — CONSULT NOTE ADULT - ASSESSMENT
67 yo female S/P B/L THR, POD#0, high thrombosis risk due to surgery, immobility    start coumadin 5 mg po tonight and cont x 6 weeks,  adjust per INR    Heparin 5000 units sq q 8 h, start tonight at 10 pm and cont until INR > 2.0 x 2 days    daily cbc, bmp, INR    venodynes BLe  INC mobility as anne    thank you for the consult, will follow

## 2017-05-26 NOTE — PATIENT PROFILE ADULT. - PMH
Anxiety    Arrhythmia  pacemaker placed in 2011  Bell's palsy    Clinical Depression    Constipation    Hearing loss  slight  Heart murmur    HTN (hypertension)    Lumbar stenosis    Lyme Disease    Osteoarthritis of both hips    Pacemaker    Sleep apnea  uses mouthpiece  TBI (traumatic brain injury)  personal history of TBI  Urine retention

## 2017-05-26 NOTE — DISCHARGE NOTE ADULT - HOSPITAL COURSE
The patient is a 66 year old female status post elective total Hip Arthroplasty to Bilateral Hip after failing outpatient nonoperative conservative management.  Patient presented to [facility] after being medically cleared for an elective surgical procedure. The patient was taken to the operating room on date mentioned above. Prophylactic antibiotics were started before the procedure and continued for 24 hours.  There were no complications during the procedure and patient tolerated the procedure well.  The patient was transferred to recovery room in stable condition and subsequently to surgical floor.  Patient was placed on Aspirin for anticoagulation.  All home medications were continued.  The patient received physical therapy daily and daily labs were followed.  The dressing was kept clean, dry, intact.  The rest of the hospital stay was unremarkable. The patient is a 66 year old female status post elective total Hip Arthroplasty to Bilateral Hip after failing outpatient nonoperative conservative management.  Patient presented to [facility] after being medically cleared for an elective surgical procedure. The patient was taken to the operating room on date mentioned above. Prophylactic antibiotics were started before the procedure and continued for 24 hours.  There were no complications during the procedure and patient tolerated the procedure well.  The patient was transferred to recovery room in stable condition and subsequently to surgical floor.  Patient was placed on heparin/Coumadin for anticoagulation.  All home medications were continued.  The patient received physical therapy daily and daily labs were followed.  The dressing was kept clean, dry, intact.  The rest of the hospital stay was unremarkable. H&P:  Pt is a 66y Female POD 4 s/p Bilateral Total Hip Arthroplasties on 5/26/17. Pt is afebrile with stable vital signs. Pain is controlled. Alert and Oriented. Exam reveals intact EHL FHL TA GS, +DP. Dressing is clean and dry with a New Aquacel bandage on.  Vital Signs Last 24 Hrs  T(C): 37.2, Max: 37.2 (05-30 @ 07:11)  T(F): 98.9, Max: 98.9 (05-30 @ 07:11)  HR: 88 (79 - 88)  BP: 124/50 (109/50 - 124/50)  BP(mean): --  RR: 16 (16 - 16)  SpO2: 95% (95% - 99%)                        9.4    7.6   )-----------( 239      ( 30 May 2017 06:16 )             29.2     PT/INR - ( 30 May 2017 10:51 )   PT: 19.7 sec;   INR: 1.80 ratio             Hospital Course:  Patient presented to [facility] after being medically cleared for an elective surgical procedure, having failed outpatient non-operative conservative management. Prophylactic antibiotics were started before the procedure and continued for 24 hours. They were admitted after surgery to the orthopedic floor.   There were no complications during the hospital stay. All home medications were continued.    Routine consults were obtained from the Anticoagulation Team for DVT/PE prophylaxis, from Physical Therapy for twice daily PT starting on POD 0, and from the Hospitalist for Medical Co-management. Patient was placed on Coumadin and Heparin SC for anticoagulation.  Pertinent home medications were continued.  Daily labs were followed.      On POD 0 the pt was OOB with PT and there were no overnight events. POD1 the hemovac drains was removed. POD 2 she received 2U PRBCs for acute blood loss anemia. PT was continued, and on POD 3 a new Aquacel dressings applied. The pt is ready today POD 4 for DC to Rehab for ongoing PT.  The orthopedic Attending is aware and agrees.

## 2017-05-26 NOTE — ASU PREOP CHECKLIST - ISOLATION PRECAUTIONS
Next apt 3/1/17    Refill pending doctor approval  RX T'D up as script not printed  RX can not be E-Scribed , when approved please route back to pool so it can be called in     none

## 2017-05-26 NOTE — DISCHARGE NOTE ADULT - CARE PROVIDER_API CALL
Brandon Knott (MD), Orthopaedic Surgery  [address] [place] [address]  Phone: [phone]  Fax: [phone]

## 2017-05-26 NOTE — DISCHARGE NOTE ADULT - PATIENT PORTAL LINK FT
“You can access the Follow[employer] Patient Portal, offered by [facility], by registering with the following website: [email or web address]”

## 2017-05-27 LAB
ANION GAP SERPL CALC-SCNC: 6 MMOL/L — SIGNIFICANT CHANGE UP (ref 5–17)
BUN SERPL-MCNC: 13 MG/DL — SIGNIFICANT CHANGE UP (ref 7–23)
CALCIUM SERPL-MCNC: 7.9 MG/DL — LOW (ref 8.5–10.1)
CHLORIDE SERPL-SCNC: 103 MMOL/L — SIGNIFICANT CHANGE UP (ref 96–108)
CO2 SERPL-SCNC: 28 MMOL/L — SIGNIFICANT CHANGE UP (ref 22–31)
CREAT SERPL-MCNC: 0.53 MG/DL — SIGNIFICANT CHANGE UP (ref 0.5–1.3)
GLUCOSE SERPL-MCNC: 150 MG/DL — HIGH (ref 70–99)
HCT VFR BLD CALC: 27.3 % — LOW (ref 34.5–45)
HGB BLD-MCNC: 8.9 G/DL — LOW (ref 11.5–15.5)
INR BLD: 1.18 RATIO — HIGH (ref 0.88–1.16)
MCHC RBC-ENTMCNC: 31.2 PG — SIGNIFICANT CHANGE UP (ref 27–34)
MCHC RBC-ENTMCNC: 32.8 GM/DL — SIGNIFICANT CHANGE UP (ref 32–36)
MCV RBC AUTO: 95.4 FL — SIGNIFICANT CHANGE UP (ref 80–100)
PLATELET # BLD AUTO: 215 K/UL — SIGNIFICANT CHANGE UP (ref 150–400)
POTASSIUM SERPL-MCNC: 4.2 MMOL/L — SIGNIFICANT CHANGE UP (ref 3.5–5.3)
POTASSIUM SERPL-SCNC: 4.2 MMOL/L — SIGNIFICANT CHANGE UP (ref 3.5–5.3)
PROTHROM AB SERPL-ACNC: 12.8 SEC — HIGH (ref 9.8–12.7)
RBC # BLD: 2.86 M/UL — LOW (ref 3.8–5.2)
RBC # FLD: 11.7 % — SIGNIFICANT CHANGE UP (ref 10.3–14.5)
SODIUM SERPL-SCNC: 137 MMOL/L — SIGNIFICANT CHANGE UP (ref 135–145)
WBC # BLD: 10.6 K/UL — HIGH (ref 3.8–10.5)
WBC # FLD AUTO: 10.6 K/UL — HIGH (ref 3.8–10.5)

## 2017-05-27 PROCEDURE — 99233 SBSQ HOSP IP/OBS HIGH 50: CPT

## 2017-05-27 RX ADMIN — Medication 500 MILLIGRAM(S): at 17:39

## 2017-05-27 RX ADMIN — Medication 975 MILLIGRAM(S): at 11:54

## 2017-05-27 RX ADMIN — Medication 20 MILLIGRAM(S): at 21:00

## 2017-05-27 RX ADMIN — POLYETHYLENE GLYCOL 3350 17 GRAM(S): 17 POWDER, FOR SOLUTION ORAL at 11:59

## 2017-05-27 RX ADMIN — Medication 975 MILLIGRAM(S): at 17:40

## 2017-05-27 RX ADMIN — OXYCODONE HYDROCHLORIDE 10 MILLIGRAM(S): 5 TABLET ORAL at 00:40

## 2017-05-27 RX ADMIN — Medication 100 MILLIGRAM(S): at 17:39

## 2017-05-27 RX ADMIN — QUETIAPINE FUMARATE 200 MILLIGRAM(S): 200 TABLET, FILM COATED ORAL at 05:39

## 2017-05-27 RX ADMIN — SODIUM CHLORIDE 75 MILLILITER(S): 9 INJECTION INTRAMUSCULAR; INTRAVENOUS; SUBCUTANEOUS at 00:50

## 2017-05-27 RX ADMIN — Medication 1 TABLET(S): at 05:38

## 2017-05-27 RX ADMIN — OXYCODONE HYDROCHLORIDE 10 MILLIGRAM(S): 5 TABLET ORAL at 05:37

## 2017-05-27 RX ADMIN — HEPARIN SODIUM 5000 UNIT(S): 5000 INJECTION INTRAVENOUS; SUBCUTANEOUS at 21:02

## 2017-05-27 RX ADMIN — OXYCODONE HYDROCHLORIDE 10 MILLIGRAM(S): 5 TABLET ORAL at 10:47

## 2017-05-27 RX ADMIN — Medication 1 TABLET(S): at 13:59

## 2017-05-27 RX ADMIN — LAMOTRIGINE 150 MILLIGRAM(S): 25 TABLET, ORALLY DISINTEGRATING ORAL at 11:56

## 2017-05-27 RX ADMIN — HYDROMORPHONE HYDROCHLORIDE 0.5 MILLIGRAM(S): 2 INJECTION INTRAMUSCULAR; INTRAVENOUS; SUBCUTANEOUS at 01:46

## 2017-05-27 RX ADMIN — Medication 100 MILLIGRAM(S): at 05:38

## 2017-05-27 RX ADMIN — Medication 1 TABLET(S): at 21:01

## 2017-05-27 RX ADMIN — Medication 50 MILLIGRAM(S): at 21:01

## 2017-05-27 RX ADMIN — SODIUM CHLORIDE 3 MILLILITER(S): 9 INJECTION INTRAMUSCULAR; INTRAVENOUS; SUBCUTANEOUS at 21:02

## 2017-05-27 RX ADMIN — Medication 75 MILLIGRAM(S): at 21:00

## 2017-05-27 RX ADMIN — Medication 100 MILLIGRAM(S): at 05:39

## 2017-05-27 RX ADMIN — HEPARIN SODIUM 5000 UNIT(S): 5000 INJECTION INTRAVENOUS; SUBCUTANEOUS at 05:39

## 2017-05-27 RX ADMIN — Medication 325 MILLIGRAM(S): at 17:38

## 2017-05-27 RX ADMIN — Medication 2 MILLIGRAM(S): at 21:01

## 2017-05-27 RX ADMIN — SODIUM CHLORIDE 3 MILLILITER(S): 9 INJECTION INTRAMUSCULAR; INTRAVENOUS; SUBCUTANEOUS at 17:41

## 2017-05-27 RX ADMIN — Medication 500 MILLIGRAM(S): at 05:38

## 2017-05-27 RX ADMIN — PANTOPRAZOLE SODIUM 40 MILLIGRAM(S): 20 TABLET, DELAYED RELEASE ORAL at 11:57

## 2017-05-27 RX ADMIN — Medication 325 MILLIGRAM(S): at 10:47

## 2017-05-27 RX ADMIN — CELECOXIB 200 MILLIGRAM(S): 200 CAPSULE ORAL at 10:47

## 2017-05-27 RX ADMIN — WARFARIN SODIUM 5 MILLIGRAM(S): 2.5 TABLET ORAL at 21:01

## 2017-05-27 RX ADMIN — CELECOXIB 200 MILLIGRAM(S): 200 CAPSULE ORAL at 10:00

## 2017-05-27 RX ADMIN — FAMOTIDINE 20 MILLIGRAM(S): 10 INJECTION INTRAVENOUS at 05:39

## 2017-05-27 RX ADMIN — SODIUM CHLORIDE 3 MILLILITER(S): 9 INJECTION INTRAMUSCULAR; INTRAVENOUS; SUBCUTANEOUS at 05:40

## 2017-05-27 RX ADMIN — Medication 325 MILLIGRAM(S): at 11:57

## 2017-05-27 RX ADMIN — OXYCODONE HYDROCHLORIDE 10 MILLIGRAM(S): 5 TABLET ORAL at 20:51

## 2017-05-27 RX ADMIN — QUETIAPINE FUMARATE 200 MILLIGRAM(S): 200 TABLET, FILM COATED ORAL at 17:39

## 2017-05-27 RX ADMIN — Medication 1 MILLIGRAM(S): at 11:58

## 2017-05-27 RX ADMIN — Medication 50 MILLIGRAM(S): at 13:56

## 2017-05-27 RX ADMIN — Medication 1 TABLET(S): at 11:55

## 2017-05-27 RX ADMIN — Medication 975 MILLIGRAM(S): at 05:38

## 2017-05-27 RX ADMIN — OXYCODONE HYDROCHLORIDE 10 MILLIGRAM(S): 5 TABLET ORAL at 10:40

## 2017-05-27 RX ADMIN — Medication 50 MILLIGRAM(S): at 05:39

## 2017-05-27 RX ADMIN — HEPARIN SODIUM 5000 UNIT(S): 5000 INJECTION INTRAVENOUS; SUBCUTANEOUS at 13:55

## 2017-05-27 RX ADMIN — HYDROMORPHONE HYDROCHLORIDE 0.5 MILLIGRAM(S): 2 INJECTION INTRAMUSCULAR; INTRAVENOUS; SUBCUTANEOUS at 00:51

## 2017-05-27 NOTE — PROGRESS NOTE ADULT - SUBJECTIVE AND OBJECTIVE BOX
Pt S&E. Pain controlled. Resting comfortably    Vital Signs Last 24 Hrs  T(C): 37.4, Max: 37.8 (05-27 @ 05:40)  T(F): 99.4, Max: 100.1 (05-27 @ 05:40)  HR: 83 (65 - 99)  BP: 111/40 (97/42 - 129/65)  BP(mean): --  RR: 18 (12 - 18)  SpO2: 96% (92% - 100%)      Gen: NAD  B/L LE:  Dsg c/d/i  +HMV  SILT L2-S1  +EHL/FHL/TA/Gastroc  DP+  Soft compartments, - calf ttp

## 2017-05-27 NOTE — PROGRESS NOTE ADULT - SUBJECTIVE AND OBJECTIVE BOX
CC- s/p b/l THR    HPI:  65yo/F with PMH anxiety, SSS s/p PPM, depression, OA presented for elective b/l THR. Medical consult called for postop medical management    PMH- as above  PSH- lap jeana, shoulder surgery, tonsillectomy, ORIF RT leg  Soc hx- denies smoking, lives at home  Fam hx- non-contributory    5/27/17- doing good, was little dizzy with PT    Review of system- All 10 systems reviewed and is as per HPI otherwise negative.     Vital Signs Last 24 Hrs  T(C): 37.4, Max: 37.8 (05-27 @ 05:40)  T(F): 99.4, Max: 100.1 (05-27 @ 05:40)  HR: 83 (70 - 99)  BP: 111/40 (99/55 - 128/50)  BP(mean): --  RR: 18 (14 - 18)  SpO2: 96% (94% - 100%)    LABS:                        8.9    10.6  )-----------( 215      ( 27 May 2017 06:10 )             27.3     27 May 2017 06:10    137    |  103    |  13     ----------------------------<  150    4.2     |  28     |  0.53     Ca    7.9        27 May 2017 06:10  PT/INR - ( 27 May 2017 06:10 )   PT: 12.8 sec;   INR: 1.18 ratio     PTT - ( 26 May 2017 12:43 )  PTT:29.0 sec    PHYSICAL EXAM:  GENERAL: NAD, well-groomed, well-developed  HEAD:  Atraumatic, Normocephalic  EYES: EOMI, PERRLA, conjunctiva and sclera clear  HEENT: Moist mucous membranes  NECK: Supple, No JVD  NERVOUS SYSTEM:  Alert & Oriented X3, Motor Strength 5/5 B/L upper and lower extremities; DTRs 2+ intact and symmetric  CHEST/LUNG: Clear to auscultation bilaterally; No rales, rhonchi, wheezing, or rubs  HEART: Regular rate and rhythm; No murmurs, rubs, or gallops  ABDOMEN: Soft, Nontender, Nondistended; Bowel sounds present  GENITOURINARY- Voiding, no palpable bladder  EXTREMITIES:  2+ Peripheral Pulses, No clubbing, cyanosis, or edema  MUSCULOSKELTAL- b/l hip dressing dry, +hemovacs  SKIN-no rash, no lesion  CNS- alert, oriented X3, non focal     Daily Height in cm: 165.1 (26 May 2017 07:09)         sodium chloride 0.9%. 1000milliLiter(s) IV Continuous <Continuous>  acetaminophen   Tablet 650milliGRAM(s) Oral every 6 hours PRN  acetaminophen   Tablet. 650milliGRAM(s) Oral every 6 hours PRN  oxyCODONE IR 5milliGRAM(s) Oral every 4 hours PRN  oxyCODONE IR 10milliGRAM(s) Oral every 4 hours PRN  HYDROmorphone  Injectable 0.5milliGRAM(s) IV Push every 4 hours PRN  aluminum hydroxide/magnesium hydroxide/simethicone Suspension 30milliLiter(s) Oral four times a day PRN  ondansetron Injectable 4milliGRAM(s) IV Push every 6 hours PRN  calcium carbonate 1250 mG + Vitamin D (OsCal 500 + D) 1Tablet(s) Oral three times a day  pantoprazole    Tablet 40milliGRAM(s) Oral daily  diphenhydrAMINE   Capsule 25milliGRAM(s) Oral at bedtime PRN  polyethylene glycol 3350 17Gram(s) Oral daily  senna 2Tablet(s) Oral at bedtime PRN  ferrous    sulfate 325milliGRAM(s) Oral three times a day with meals  folic acid 1milliGRAM(s) Oral daily  multivitamin 1Tablet(s) Oral daily  ascorbic acid 500milliGRAM(s) Oral two times a day  acetaminophen   Tablet 975milliGRAM(s) Oral every 6 hours  oxyCODONE ER Tablet 10milliGRAM(s) Oral every 12 hours  celecoxib 200milliGRAM(s) Oral with breakfast  oxyCODONE IR 5milliGRAM(s) Oral every 4 hours PRN  oxyCODONE IR 10milliGRAM(s) Oral every 4 hours PRN  HYDROmorphone  Injectable 0.5milliGRAM(s) IV Push every 3 hours PRN  ondansetron Injectable 4milliGRAM(s) IV Push every 6 hours PRN  docusate sodium 100milliGRAM(s) Oral every 12 hours  ropivacaine 0.2% in 0.9% Sodium Chloride PCRA 250milliLiter(s) Regional Catheter PCA Continuous  diphenhydrAMINE   Injectable 25milliGRAM(s) IV Push every 4 hours PRN  ropivacaine 0.2% in 0.9% Sodium Chloride PCRA 250milliLiter(s) Regional Catheter PCA Continuous  ceFAZolin   IVPB 2000milliGRAM(s) IV Intermittent every 6 hours  warfarin 5milliGRAM(s) Oral daily  heparin  Injectable 5000Unit(s) SubCutaneous every 8 hours    Assessment/Plan  #S/p b/l THR/anemia acute blood loss postop  Ortho f/u appreciated  Pain meds prn  PT as tolerated  AC by Heparin+coumadin  Monitor HH  Bowel regimen  Incentive spirometry    #Anxiety/depression- stable    #Dispo- likely [facility] on Tuesday

## 2017-05-27 NOTE — PROGRESS NOTE ADULT - SUBJECTIVE AND OBJECTIVE BOX
HPI:      Patient is a 66y old  Female who presents with a chief complaint of inc B/L hip pain, OA, now S/P  b/l hip replacements (26 May 2017 11:55)  H/O arrythmia, no AC, on Labetolol, has PPM    Consulted by Dr. Knott    for VTE prophylaxis, risk stratification, and anticoagulation management.    PAST MEDICAL & SURGICAL HISTORY:  TBI (traumatic brain injury): personal history of TBI  Constipation  Urine retention  Arrhythmia: pacemaker placed in 2011  Pacemaker  Sleep apnea: uses mouthpiece  Lumbar stenosis  Anxiety  HTN (hypertension)  Heart murmur  [place] palsy  Hearing loss: slight  Osteoarthritis of both hips  Lyme Disease  Clinical Depression  S/P ORIF (open reduction internal fixation) fracture: right leg - 2011  H/O shoulder surgery: fanta procedure - 1986  S/P Laparoscopic Cholecystectomy: 15 yrs ago  S/P Arthroscopy of Left Knee: 1990&#x27;s  S/P Rhinoplasty: 1970&#x27;s  History of Tonsillectomy: age 22          CrCl: 100.8    Caprini VTE Risk Score: #8    IMPROVE Bleeding Risk Score #1.5    Falls Risk:   High (X  )  Mod (  )  Low (  )      FAMILY HISTORY:  No pertinent family history in first degree relatives    Denies any personal or familial history of clotting or bleeding disorders.    Allergies    erythromycin (Rash)    Intolerances        REVIEW OF SYSTEMS    (  )Fever	     (  )Constipation	(  )SOB				(  )Headache	(  )Dysuria  (  )Chills	     (  )Melena	(  )Dyspnea present on exertion	                    (  )Dizziness                    (  )Polyuria  (  )Nausea	     (  )Hematochezia	(  )Cough			                    (  )Syncope   	(  )Hematuria  (  )Vomiting    (  )Chest Pain	(  )Wheezing			(  )Weakness  (  )Diarrhea     (  )Palpitations	(  )Anorexia			(  )Myalgia    All other review of systems negative: Yes          PHYSICAL EXAM:    Constitutional: Appears Well    Neurological: A& O x 3    Skin: Warm    Respiratory and Thorax: normal effort; Breath sounds: normal; No rales/wheezing/rhonchi  	  Cardiovascular: S1, S2, regular, NMBR	    Gastrointestinal: BS + x 4Q, nontender	    Genitourinary:  Bladder nondistended, nontender    Musculoskeletal:   General Right:   + muscle/joint tenderness,   normal tone, no joint swelling,   ROM: limited	    General Left:   + muscle/joint tenderness,   normal tone, no joint swelling,   ROM: limited    Hip:  Right: Dressing CDI; Drain: hemovac ; Type of drng.: serosang.; Amt. of drng: small            Left: Dressing CDI; Drain: hemovac ; Type of drng.: serosang.; Amt. of drng: small          Lower extrems:   Right: no calf tenderness              negative julian's sign               + pedal pulses    Left:   no calf tenderness              negative julian's sign               + pedal pulses                            8.9    10.6  )-----------( 215      ( 27 May 2017 06:10 )             27.3       05-27    137  |  103  |  13  ----------------------------<  150<H>  4.2   |  28  |  0.53    Ca    7.9<L>      27 May 2017 06:10        PT/INR - ( 27 May 2017 06:10 )   PT: 12.8 sec;   INR: 1.18 ratio         PTT - ( 26 May 2017 12:43 )  PTT:29.0 sec                    9.7    9.4   )-----------( 251      ( 26 May 2017 12:43 )             28.6       05-26    142  |  107  |  20  ----------------------------<  111<H>  4.1   |  28  |  0.63    Ca    8.3<L>      26 May 2017 12:43        PT/INR - ( 26 May 2017 12:43 )   PT: 11.6 sec;   INR: 1.07 ratio         PTT - ( 26 May 2017 12:43 )  PTT:29.0 sec				    MEDICATIONS  (STANDING):  sodium chloride 0.9% lock flush 3milliLiter(s) IV Push every 8 hours  sodium chloride 0.9%. 1000milliLiter(s) IV Continuous <Continuous>  calcium carbonate 1250 mG + Vitamin D (OsCal 500 + D) 1Tablet(s) Oral three times a day  pantoprazole    Tablet 40milliGRAM(s) Oral daily  polyethylene glycol 3350 17Gram(s) Oral daily  ferrous    sulfate 325milliGRAM(s) Oral three times a day with meals  folic acid 1milliGRAM(s) Oral daily  multivitamin 1Tablet(s) Oral daily  ascorbic acid 500milliGRAM(s) Oral two times a day  amitriptyline 75milliGRAM(s) Oral at bedtime  bethanechol 50milliGRAM(s) Oral three times a day  clonazePAM Tablet 2milliGRAM(s) Oral at bedtime  labetalol 100milliGRAM(s) Oral daily  lamoTRIgine 150milliGRAM(s) Oral daily  PARoxetine 20milliGRAM(s) Oral at bedtime  QUEtiapine 200milliGRAM(s) Oral two times a day  acetaminophen   Tablet 975milliGRAM(s) Oral every 6 hours  oxyCODONE ER Tablet 10milliGRAM(s) Oral every 12 hours  celecoxib 200milliGRAM(s) Oral with breakfast  docusate sodium 100milliGRAM(s) Oral every 12 hours  ropivacaine 0.2% in 0.9% Sodium Chloride PCRA 250milliLiter(s) Regional Catheter PCA Continuous  ropivacaine 0.2% in 0.9% Sodium Chloride PCRA 250milliLiter(s) Regional Catheter PCA Continuous  warfarin 5milliGRAM(s) Oral daily  heparin  Injectable 5000Unit(s) SubCutaneous every 8 hours        DVT Prophylaxis:  LMWH                   (  )  Heparin SQ           (  )  Coumadin             (x  )  Xarelto                  (  )  Eliquis                   (  )  Venodynes           (  x)  Ambulation          ( x )  UFH                       (  )  Contraindicated  (  )

## 2017-05-27 NOTE — PROGRESS NOTE ADULT - ASSESSMENT
65 yo female S/P B/L THR, POD#1, high thrombosis risk due to surgery, immobility    cont coumadin 5 mg po daily cont x 6 weeks,  adjust per INR    cont Heparin 5000 units sq q 8 h,  until INR > 2.0 x 2 days    daily cbc, bmp, INR    venodynes BLe  INC mobility as anne

## 2017-05-28 LAB
ANION GAP SERPL CALC-SCNC: 6 MMOL/L — SIGNIFICANT CHANGE UP (ref 5–17)
BUN SERPL-MCNC: 11 MG/DL — SIGNIFICANT CHANGE UP (ref 7–23)
CALCIUM SERPL-MCNC: 7.9 MG/DL — LOW (ref 8.5–10.1)
CHLORIDE SERPL-SCNC: 109 MMOL/L — HIGH (ref 96–108)
CO2 SERPL-SCNC: 26 MMOL/L — SIGNIFICANT CHANGE UP (ref 22–31)
CREAT SERPL-MCNC: 0.56 MG/DL — SIGNIFICANT CHANGE UP (ref 0.5–1.3)
GLUCOSE SERPL-MCNC: 112 MG/DL — HIGH (ref 70–99)
HCT VFR BLD CALC: 20.6 % — CRITICAL LOW (ref 34.5–45)
HCT VFR BLD CALC: 23.7 % — LOW (ref 34.5–45)
HGB BLD-MCNC: 7 G/DL — CRITICAL LOW (ref 11.5–15.5)
HGB BLD-MCNC: 7.7 G/DL — LOW (ref 11.5–15.5)
INR BLD: 2.16 RATIO — HIGH (ref 0.88–1.16)
MCHC RBC-ENTMCNC: 31.2 PG — SIGNIFICANT CHANGE UP (ref 27–34)
MCHC RBC-ENTMCNC: 32.2 PG — SIGNIFICANT CHANGE UP (ref 27–34)
MCHC RBC-ENTMCNC: 32.6 GM/DL — SIGNIFICANT CHANGE UP (ref 32–36)
MCHC RBC-ENTMCNC: 34.2 GM/DL — SIGNIFICANT CHANGE UP (ref 32–36)
MCV RBC AUTO: 94 FL — SIGNIFICANT CHANGE UP (ref 80–100)
MCV RBC AUTO: 95.6 FL — SIGNIFICANT CHANGE UP (ref 80–100)
PLATELET # BLD AUTO: 156 K/UL — SIGNIFICANT CHANGE UP (ref 150–400)
PLATELET # BLD AUTO: 192 K/UL — SIGNIFICANT CHANGE UP (ref 150–400)
POTASSIUM SERPL-MCNC: 4.2 MMOL/L — SIGNIFICANT CHANGE UP (ref 3.5–5.3)
POTASSIUM SERPL-SCNC: 4.2 MMOL/L — SIGNIFICANT CHANGE UP (ref 3.5–5.3)
PROTHROM AB SERPL-ACNC: 23.7 SEC — HIGH (ref 9.8–12.7)
RBC # BLD: 2.19 M/UL — LOW (ref 3.8–5.2)
RBC # BLD: 2.48 M/UL — LOW (ref 3.8–5.2)
RBC # FLD: 11.6 % — SIGNIFICANT CHANGE UP (ref 10.3–14.5)
RBC # FLD: 12.3 % — SIGNIFICANT CHANGE UP (ref 10.3–14.5)
SODIUM SERPL-SCNC: 141 MMOL/L — SIGNIFICANT CHANGE UP (ref 135–145)
WBC # BLD: 7.7 K/UL — SIGNIFICANT CHANGE UP (ref 3.8–10.5)
WBC # BLD: 8.3 K/UL — SIGNIFICANT CHANGE UP (ref 3.8–10.5)
WBC # FLD AUTO: 7.7 K/UL — SIGNIFICANT CHANGE UP (ref 3.8–10.5)
WBC # FLD AUTO: 8.3 K/UL — SIGNIFICANT CHANGE UP (ref 3.8–10.5)

## 2017-05-28 PROCEDURE — 99233 SBSQ HOSP IP/OBS HIGH 50: CPT

## 2017-05-28 RX ORDER — WARFARIN SODIUM 2.5 MG/1
1 TABLET ORAL DAILY
Qty: 0 | Refills: 0 | Status: DISCONTINUED | OUTPATIENT
Start: 2017-05-28 | End: 2017-05-29

## 2017-05-28 RX ORDER — SODIUM CHLORIDE 9 MG/ML
1000 INJECTION INTRAMUSCULAR; INTRAVENOUS; SUBCUTANEOUS ONCE
Qty: 0 | Refills: 0 | Status: COMPLETED | OUTPATIENT
Start: 2017-05-28 | End: 2017-05-29

## 2017-05-28 RX ADMIN — Medication 100 MILLIGRAM(S): at 18:23

## 2017-05-28 RX ADMIN — Medication 50 MILLIGRAM(S): at 15:09

## 2017-05-28 RX ADMIN — Medication 975 MILLIGRAM(S): at 18:24

## 2017-05-28 RX ADMIN — Medication 1 MILLIGRAM(S): at 12:05

## 2017-05-28 RX ADMIN — CELECOXIB 200 MILLIGRAM(S): 200 CAPSULE ORAL at 10:35

## 2017-05-28 RX ADMIN — Medication 1 TABLET(S): at 05:50

## 2017-05-28 RX ADMIN — Medication 1 TABLET(S): at 21:03

## 2017-05-28 RX ADMIN — WARFARIN SODIUM 1 MILLIGRAM(S): 2.5 TABLET ORAL at 21:02

## 2017-05-28 RX ADMIN — Medication 50 MILLIGRAM(S): at 21:02

## 2017-05-28 RX ADMIN — Medication 500 MILLIGRAM(S): at 18:23

## 2017-05-28 RX ADMIN — SODIUM CHLORIDE 3 MILLILITER(S): 9 INJECTION INTRAMUSCULAR; INTRAVENOUS; SUBCUTANEOUS at 13:51

## 2017-05-28 RX ADMIN — Medication 100 MILLIGRAM(S): at 05:51

## 2017-05-28 RX ADMIN — OXYCODONE HYDROCHLORIDE 10 MILLIGRAM(S): 5 TABLET ORAL at 10:35

## 2017-05-28 RX ADMIN — OXYCODONE HYDROCHLORIDE 10 MILLIGRAM(S): 5 TABLET ORAL at 21:03

## 2017-05-28 RX ADMIN — Medication 20 MILLIGRAM(S): at 21:02

## 2017-05-28 RX ADMIN — SODIUM CHLORIDE 3 MILLILITER(S): 9 INJECTION INTRAMUSCULAR; INTRAVENOUS; SUBCUTANEOUS at 21:05

## 2017-05-28 RX ADMIN — Medication 975 MILLIGRAM(S): at 12:05

## 2017-05-28 RX ADMIN — SODIUM CHLORIDE 3 MILLILITER(S): 9 INJECTION INTRAMUSCULAR; INTRAVENOUS; SUBCUTANEOUS at 05:54

## 2017-05-28 RX ADMIN — Medication 50 MILLIGRAM(S): at 05:51

## 2017-05-28 RX ADMIN — HEPARIN SODIUM 5000 UNIT(S): 5000 INJECTION INTRAVENOUS; SUBCUTANEOUS at 05:51

## 2017-05-28 RX ADMIN — Medication 500 MILLIGRAM(S): at 05:50

## 2017-05-28 RX ADMIN — Medication 1 TABLET(S): at 15:10

## 2017-05-28 RX ADMIN — HEPARIN SODIUM 5000 UNIT(S): 5000 INJECTION INTRAVENOUS; SUBCUTANEOUS at 21:03

## 2017-05-28 RX ADMIN — POLYETHYLENE GLYCOL 3350 17 GRAM(S): 17 POWDER, FOR SOLUTION ORAL at 12:04

## 2017-05-28 RX ADMIN — CELECOXIB 200 MILLIGRAM(S): 200 CAPSULE ORAL at 09:35

## 2017-05-28 RX ADMIN — Medication 1 TABLET(S): at 12:05

## 2017-05-28 RX ADMIN — Medication 975 MILLIGRAM(S): at 05:49

## 2017-05-28 RX ADMIN — Medication 975 MILLIGRAM(S): at 00:40

## 2017-05-28 RX ADMIN — Medication 325 MILLIGRAM(S): at 12:04

## 2017-05-28 RX ADMIN — LAMOTRIGINE 150 MILLIGRAM(S): 25 TABLET, ORALLY DISINTEGRATING ORAL at 12:05

## 2017-05-28 RX ADMIN — PANTOPRAZOLE SODIUM 40 MILLIGRAM(S): 20 TABLET, DELAYED RELEASE ORAL at 12:05

## 2017-05-28 RX ADMIN — QUETIAPINE FUMARATE 200 MILLIGRAM(S): 200 TABLET, FILM COATED ORAL at 18:23

## 2017-05-28 RX ADMIN — QUETIAPINE FUMARATE 200 MILLIGRAM(S): 200 TABLET, FILM COATED ORAL at 05:51

## 2017-05-28 RX ADMIN — Medication 325 MILLIGRAM(S): at 18:23

## 2017-05-28 RX ADMIN — HEPARIN SODIUM 5000 UNIT(S): 5000 INJECTION INTRAVENOUS; SUBCUTANEOUS at 15:09

## 2017-05-28 RX ADMIN — Medication 2 MILLIGRAM(S): at 21:03

## 2017-05-28 RX ADMIN — OXYCODONE HYDROCHLORIDE 10 MILLIGRAM(S): 5 TABLET ORAL at 09:35

## 2017-05-28 RX ADMIN — Medication 75 MILLIGRAM(S): at 21:02

## 2017-05-28 RX ADMIN — Medication 325 MILLIGRAM(S): at 09:35

## 2017-05-28 NOTE — PROGRESS NOTE ADULT - ASSESSMENT
65 yo female S/P B/L THR, POD#1, high thrombosis risk due to surgery, immobility      INR 1.18-2.16 in 24 hours, will decrease coumadin to 1 mg for tonight  cont heparin for one more therapeutic day    daily cbc, bmp, INR    venodynes BLe  INC mobility as anne

## 2017-05-28 NOTE — PROGRESS NOTE ADULT - SUBJECTIVE AND OBJECTIVE BOX
HPI:      Patient is a 66y old  Female who presents with a chief complaint of inc B/L hip pain, OA, now S/P  b/l hip replacements (26 May 2017 11:55)  H/O arrythmia, no AC, on Labetolol, has PPM    Consulted by Dr. Knott    for VTE prophylaxis, risk stratification, and anticoagulation management.    PAST MEDICAL & SURGICAL HISTORY:  TBI (traumatic brain injury): personal history of TBI  Constipation  Urine retention  Arrhythmia: pacemaker placed in 2011  Pacemaker  Sleep apnea: uses mouthpiece  Lumbar stenosis  Anxiety  HTN (hypertension)  Heart murmur  [place] palsy  Hearing loss: slight  Osteoarthritis of both hips  Lyme Disease  Clinical Depression  S/P ORIF (open reduction internal fixation) fracture: right leg - 2011  H/O shoulder surgery: fanta procedure - 1986  S/P Laparoscopic Cholecystectomy: 15 yrs ago  S/P Arthroscopy of Left Knee: 1990&#x27;s  S/P Rhinoplasty: 1970&#x27;s  History of Tonsillectomy: age 22          CrCl: 100.8    Caprini VTE Risk Score: #8    IMPROVE Bleeding Risk Score #1.5    Falls Risk:   High (X  )  Mod (  )  Low (  )      FAMILY HISTORY:  No pertinent family history in first degree relatives    Denies any personal or familial history of clotting or bleeding disorders.    Allergies    erythromycin (Rash)    Intolerances        REVIEW OF SYSTEMS    (  )Fever	     (  )Constipation	(  )SOB				(  )Headache	(  )Dysuria  (  )Chills	     (  )Melena	(  )Dyspnea present on exertion	                    (  )Dizziness                    (  )Polyuria  (  )Nausea	     (  )Hematochezia	(  )Cough			                    (  )Syncope   	(  )Hematuria  (  )Vomiting    (  )Chest Pain	(  )Wheezing			(  )Weakness  (  )Diarrhea     (  )Palpitations	(  )Anorexia			(  )Myalgia    All other review of systems negative: Yes          PHYSICAL EXAM:    Constitutional: Appears Well    Neurological: A& O x 3    Skin: Warm    Respiratory and Thorax: normal effort; Breath sounds: normal; No rales/wheezing/rhonchi  	  Cardiovascular: S1, S2, regular, NMBR	    Gastrointestinal: BS + x 4Q, nontender	    Genitourinary:  Bladder nondistended, nontender    Musculoskeletal:   General Right:   + muscle/joint tenderness,   normal tone, no joint swelling,   ROM: limited	    General Left:   + muscle/joint tenderness,   normal tone, no joint swelling,   ROM: limited    Hip:  Right: Dressing CDI;             Left: Dressing CDI;       Lower extrems:   Right: no calf tenderness              negative julian's sign               + pedal pulses    Left:   no calf tenderness              negative julian's sign               + pedal pulses                            7.7    8.3   )-----------( 192      ( 28 May 2017 05:44 )             23.7       05-28    141  |  109<H>  |  11  ----------------------------<  112<H>  4.2   |  26  |  0.56    Ca    7.9<L>      28 May 2017 05:44        PT/INR - ( 28 May 2017 05:44 )   PT: 23.7 sec;   INR: 2.16 ratio    Pt/INR      27 May, 2017                                    INR 1.8  PT/INR - ( 26 May 2017 12:43 )   PT: 11.6 sec;   INR: 1.07 ratio                 PTT - ( 26 May 2017 12:43 )  PTT:29.0 sec                      8.9    10.6  )-----------( 215      ( 27 May 2017 06:10 )             27.3       05-27    137  |  103  |  13  ----------------------------<  150<H>  4.2   |  28  |  0.53    Ca    7.9<L>      27 May 2017 06:10        PT/INR - ( 26 May 2017 12:43 )   PT: 11.6 sec;   INR: 1.07 ratio         PTT - ( 26 May 2017 12:43 )  PTT:29.0 sec                    9.7    9.4   )-----------( 251      ( 26 May 2017 12:43 )             28.6       05-26    142  |  107  |  20  ----------------------------<  111<H>  4.1   |  28  |  0.63    Ca    8.3<L>      26 May 2017 12:43           PTT - ( 26 May 2017 12:43 )  PTT:29.0 sec				    MEDICATIONS  (STANDING):  sodium chloride 0.9% lock flush 3milliLiter(s) IV Push every 8 hours  sodium chloride 0.9%. 1000milliLiter(s) IV Continuous <Continuous>  calcium carbonate 1250 mG + Vitamin D (OsCal 500 + D) 1Tablet(s) Oral three times a day  pantoprazole    Tablet 40milliGRAM(s) Oral daily  polyethylene glycol 3350 17Gram(s) Oral daily  ferrous    sulfate 325milliGRAM(s) Oral three times a day with meals  folic acid 1milliGRAM(s) Oral daily  multivitamin 1Tablet(s) Oral daily  ascorbic acid 500milliGRAM(s) Oral two times a day  amitriptyline 75milliGRAM(s) Oral at bedtime  bethanechol 50milliGRAM(s) Oral three times a day  clonazePAM Tablet 2milliGRAM(s) Oral at bedtime  labetalol 100milliGRAM(s) Oral daily  lamoTRIgine 150milliGRAM(s) Oral daily  PARoxetine 20milliGRAM(s) Oral at bedtime  QUEtiapine 200milliGRAM(s) Oral two times a day  acetaminophen   Tablet 975milliGRAM(s) Oral every 6 hours  oxyCODONE ER Tablet 10milliGRAM(s) Oral every 12 hours  celecoxib 200milliGRAM(s) Oral with breakfast  docusate sodium 100milliGRAM(s) Oral every 12 hours  ropivacaine 0.2% in 0.9% Sodium Chloride PCRA 250milliLiter(s) Regional Catheter PCA Continuous  ropivacaine 0.2% in 0.9% Sodium Chloride PCRA 250milliLiter(s) Regional Catheter PCA Continuous  warfarin 5milliGRAM(s) Oral daily  heparin  Injectable 5000Unit(s) SubCutaneous every 8 hours  sodium chloride 0.9% Bolus 1000milliLiter(s) IV Bolus once          DVT Prophylaxis:  LMWH                   (  )  Heparin SQ           (  )  Coumadin             (x  )  Xarelto                  (  )  Eliquis                   (  )  Venodynes           (  x)  Ambulation          ( x )  UFH                       (  )  Contraindicated  (  )

## 2017-05-28 NOTE — PROGRESS NOTE ADULT - SUBJECTIVE AND OBJECTIVE BOX
Pt S/E at bedside, no acute events overnight, pain controlled    AVSS  Gen: NAD, AAOx3    Bilateral Lower Extremity:  Dressings clean dry intact  +EHL/FHL/TA/GS  SILT L3-S1  +DP/PT Pulses  Compartments soft  No calf TTP B/L

## 2017-05-28 NOTE — PROGRESS NOTE ADULT - SUBJECTIVE AND OBJECTIVE BOX
CC- s/p b/l THR    HPI:  67yo/F with PMH anxiety, SSS s/p PPM, depression, OA presented for elective b/l THR. Medical consult called for postop medical management    PMH- as above  PSH- lap jeana, shoulder surgery, tonsillectomy, ORIF RT leg  Soc hx- denies smoking, lives at home  Fam hx- non-contributory    5/27/17- doing good, was little dizzy with PT  5/28/17 doing OK, for 2 Units PRBC    Review of system- All 10 systems reviewed and is as per HPI otherwise negative.     Vital Signs Last 24 Hrs  T(C): 37.3, Max: 37.3 (05-28 @ 13:31)  T(F): 99.1, Max: 99.1 (05-28 @ 13:31)  HR: 88 (73 - 88)  BP: 97/44 (89/37 - 110/33)  BP(mean): --  RR: 16 (16 - 18)  SpO2: 97% (95% - 98%)    LABS:                                7.7    8.3   )-----------( 192      ( 28 May 2017 05:44 )             23.7     28 May 2017 05:44    141    |  109    |  11     ----------------------------<  112    4.2     |  26     |  0.56     Ca    7.9        28 May 2017 05:44  PT/INR - ( 28 May 2017 05:44 )   PT: 23.7 sec;   INR: 2.16 ratio      PHYSICAL EXAM:  GENERAL: NAD, well-groomed, well-developed  HEAD:  Atraumatic, Normocephalic  EYES: EOMI, PERRLA, conjunctiva and sclera clear  HEENT: Moist mucous membranes  NECK: Supple, No JVD  NERVOUS SYSTEM:  Alert & Oriented X3, Motor Strength 5/5 B/L upper and lower extremities; DTRs 2+ intact and symmetric  CHEST/LUNG: Clear to auscultation bilaterally; No rales, rhonchi, wheezing, or rubs  HEART: Regular rate and rhythm; No murmurs, rubs, or gallops  ABDOMEN: Soft, Nontender, Nondistended; Bowel sounds present  GENITOURINARY- Voiding, no palpable bladder  EXTREMITIES:  2+ Peripheral Pulses, No clubbing, cyanosis, or edema  MUSCULOSKELTAL- b/l hip dressing dry  SKIN-no rash, no lesion  CNS- alert, oriented X3, non focal     Daily Height in cm: 165.1 (26 May 2017 07:09)         sodium chloride 0.9%. 1000milliLiter(s) IV Continuous <Continuous>  acetaminophen   Tablet 650milliGRAM(s) Oral every 6 hours PRN  acetaminophen   Tablet. 650milliGRAM(s) Oral every 6 hours PRN  oxyCODONE IR 5milliGRAM(s) Oral every 4 hours PRN  oxyCODONE IR 10milliGRAM(s) Oral every 4 hours PRN  HYDROmorphone  Injectable 0.5milliGRAM(s) IV Push every 4 hours PRN  aluminum hydroxide/magnesium hydroxide/simethicone Suspension 30milliLiter(s) Oral four times a day PRN  ondansetron Injectable 4milliGRAM(s) IV Push every 6 hours PRN  calcium carbonate 1250 mG + Vitamin D (OsCal 500 + D) 1Tablet(s) Oral three times a day  pantoprazole    Tablet 40milliGRAM(s) Oral daily  diphenhydrAMINE   Capsule 25milliGRAM(s) Oral at bedtime PRN  polyethylene glycol 3350 17Gram(s) Oral daily  senna 2Tablet(s) Oral at bedtime PRN  ferrous    sulfate 325milliGRAM(s) Oral three times a day with meals  folic acid 1milliGRAM(s) Oral daily  multivitamin 1Tablet(s) Oral daily  ascorbic acid 500milliGRAM(s) Oral two times a day  acetaminophen   Tablet 975milliGRAM(s) Oral every 6 hours  oxyCODONE ER Tablet 10milliGRAM(s) Oral every 12 hours  celecoxib 200milliGRAM(s) Oral with breakfast  oxyCODONE IR 5milliGRAM(s) Oral every 4 hours PRN  oxyCODONE IR 10milliGRAM(s) Oral every 4 hours PRN  HYDROmorphone  Injectable 0.5milliGRAM(s) IV Push every 3 hours PRN  ondansetron Injectable 4milliGRAM(s) IV Push every 6 hours PRN  docusate sodium 100milliGRAM(s) Oral every 12 hours  ropivacaine 0.2% in 0.9% Sodium Chloride PCRA 250milliLiter(s) Regional Catheter PCA Continuous  diphenhydrAMINE   Injectable 25milliGRAM(s) IV Push every 4 hours PRN  ropivacaine 0.2% in 0.9% Sodium Chloride PCRA 250milliLiter(s) Regional Catheter PCA Continuous  ceFAZolin   IVPB 2000milliGRAM(s) IV Intermittent every 6 hours  warfarin 5milliGRAM(s) Oral daily  heparin  Injectable 5000Unit(s) SubCutaneous every 8 hours    Assessment/Plan  #S/p b/l THR/anemia acute blood loss postop  Ortho f/u appreciated  Pain meds prn  PT as tolerated  AC by Heparin+coumadin  For 2 Units PRBC transfusion today  Bowel regimen  Incentive spirometry    #Anxiety/depression- stable    #Dispo- likely [facility] on Tuesday

## 2017-05-29 LAB
ANION GAP SERPL CALC-SCNC: 7 MMOL/L — SIGNIFICANT CHANGE UP (ref 5–17)
BUN SERPL-MCNC: 8 MG/DL — SIGNIFICANT CHANGE UP (ref 7–23)
CALCIUM SERPL-MCNC: 7.8 MG/DL — LOW (ref 8.5–10.1)
CHLORIDE SERPL-SCNC: 110 MMOL/L — HIGH (ref 96–108)
CO2 SERPL-SCNC: 26 MMOL/L — SIGNIFICANT CHANGE UP (ref 22–31)
CREAT SERPL-MCNC: 0.43 MG/DL — LOW (ref 0.5–1.3)
GLUCOSE SERPL-MCNC: 109 MG/DL — HIGH (ref 70–99)
HCT VFR BLD CALC: 27.4 % — LOW (ref 34.5–45)
HGB BLD-MCNC: 9.3 G/DL — LOW (ref 11.5–15.5)
INR BLD: 3.04 RATIO — HIGH (ref 0.88–1.16)
MCHC RBC-ENTMCNC: 31.7 PG — SIGNIFICANT CHANGE UP (ref 27–34)
MCHC RBC-ENTMCNC: 33.8 GM/DL — SIGNIFICANT CHANGE UP (ref 32–36)
MCV RBC AUTO: 93.6 FL — SIGNIFICANT CHANGE UP (ref 80–100)
PLATELET # BLD AUTO: 177 K/UL — SIGNIFICANT CHANGE UP (ref 150–400)
POTASSIUM SERPL-MCNC: 3.8 MMOL/L — SIGNIFICANT CHANGE UP (ref 3.5–5.3)
POTASSIUM SERPL-SCNC: 3.8 MMOL/L — SIGNIFICANT CHANGE UP (ref 3.5–5.3)
PROTHROM AB SERPL-ACNC: 33.6 SEC — HIGH (ref 9.8–12.7)
RBC # BLD: 2.93 M/UL — LOW (ref 3.8–5.2)
RBC # FLD: 13.1 % — SIGNIFICANT CHANGE UP (ref 10.3–14.5)
SODIUM SERPL-SCNC: 143 MMOL/L — SIGNIFICANT CHANGE UP (ref 135–145)
WBC # BLD: 7.6 K/UL — SIGNIFICANT CHANGE UP (ref 3.8–10.5)
WBC # FLD AUTO: 7.6 K/UL — SIGNIFICANT CHANGE UP (ref 3.8–10.5)

## 2017-05-29 PROCEDURE — 99233 SBSQ HOSP IP/OBS HIGH 50: CPT

## 2017-05-29 RX ORDER — SODIUM CHLORIDE 9 MG/ML
250 INJECTION INTRAMUSCULAR; INTRAVENOUS; SUBCUTANEOUS ONCE
Qty: 0 | Refills: 0 | Status: COMPLETED | OUTPATIENT
Start: 2017-05-29 | End: 2017-05-29

## 2017-05-29 RX ORDER — WARFARIN SODIUM 2.5 MG/1
0.5 TABLET ORAL DAILY
Qty: 0 | Refills: 0 | Status: DISCONTINUED | OUTPATIENT
Start: 2017-05-29 | End: 2017-05-30

## 2017-05-29 RX ADMIN — Medication 75 MILLIGRAM(S): at 21:14

## 2017-05-29 RX ADMIN — SODIUM CHLORIDE 3 MILLILITER(S): 9 INJECTION INTRAMUSCULAR; INTRAVENOUS; SUBCUTANEOUS at 06:44

## 2017-05-29 RX ADMIN — Medication 1 TABLET(S): at 10:21

## 2017-05-29 RX ADMIN — POLYETHYLENE GLYCOL 3350 17 GRAM(S): 17 POWDER, FOR SOLUTION ORAL at 10:22

## 2017-05-29 RX ADMIN — Medication 975 MILLIGRAM(S): at 17:12

## 2017-05-29 RX ADMIN — Medication 500 MILLIGRAM(S): at 17:12

## 2017-05-29 RX ADMIN — SODIUM CHLORIDE 125 MILLILITER(S): 9 INJECTION INTRAMUSCULAR; INTRAVENOUS; SUBCUTANEOUS at 10:04

## 2017-05-29 RX ADMIN — Medication 50 MILLIGRAM(S): at 21:13

## 2017-05-29 RX ADMIN — QUETIAPINE FUMARATE 200 MILLIGRAM(S): 200 TABLET, FILM COATED ORAL at 06:35

## 2017-05-29 RX ADMIN — Medication 500 MILLIGRAM(S): at 06:34

## 2017-05-29 RX ADMIN — Medication 100 MILLIGRAM(S): at 06:34

## 2017-05-29 RX ADMIN — Medication 1 TABLET(S): at 13:32

## 2017-05-29 RX ADMIN — Medication 1 TABLET(S): at 21:13

## 2017-05-29 RX ADMIN — Medication 975 MILLIGRAM(S): at 00:47

## 2017-05-29 RX ADMIN — Medication 2 MILLIGRAM(S): at 21:13

## 2017-05-29 RX ADMIN — QUETIAPINE FUMARATE 200 MILLIGRAM(S): 200 TABLET, FILM COATED ORAL at 21:14

## 2017-05-29 RX ADMIN — CELECOXIB 200 MILLIGRAM(S): 200 CAPSULE ORAL at 10:21

## 2017-05-29 RX ADMIN — SODIUM CHLORIDE 125 MILLILITER(S): 9 INJECTION INTRAMUSCULAR; INTRAVENOUS; SUBCUTANEOUS at 21:12

## 2017-05-29 RX ADMIN — PANTOPRAZOLE SODIUM 40 MILLIGRAM(S): 20 TABLET, DELAYED RELEASE ORAL at 10:21

## 2017-05-29 RX ADMIN — HEPARIN SODIUM 5000 UNIT(S): 5000 INJECTION INTRAVENOUS; SUBCUTANEOUS at 06:35

## 2017-05-29 RX ADMIN — SODIUM CHLORIDE 3 MILLILITER(S): 9 INJECTION INTRAMUSCULAR; INTRAVENOUS; SUBCUTANEOUS at 13:32

## 2017-05-29 RX ADMIN — Medication 650 MILLIGRAM(S): at 21:13

## 2017-05-29 RX ADMIN — Medication 20 MILLIGRAM(S): at 21:13

## 2017-05-29 RX ADMIN — Medication 975 MILLIGRAM(S): at 12:03

## 2017-05-29 RX ADMIN — Medication 1 MILLIGRAM(S): at 10:22

## 2017-05-29 RX ADMIN — Medication 50 MILLIGRAM(S): at 06:34

## 2017-05-29 RX ADMIN — LAMOTRIGINE 150 MILLIGRAM(S): 25 TABLET, ORALLY DISINTEGRATING ORAL at 10:23

## 2017-05-29 RX ADMIN — Medication 650 MILLIGRAM(S): at 22:08

## 2017-05-29 RX ADMIN — SODIUM CHLORIDE 3 MILLILITER(S): 9 INJECTION INTRAMUSCULAR; INTRAVENOUS; SUBCUTANEOUS at 21:18

## 2017-05-29 RX ADMIN — SODIUM CHLORIDE 500 MILLILITER(S): 9 INJECTION INTRAMUSCULAR; INTRAVENOUS; SUBCUTANEOUS at 10:07

## 2017-05-29 RX ADMIN — Medication 50 MILLIGRAM(S): at 13:31

## 2017-05-29 RX ADMIN — Medication 1 TABLET(S): at 06:34

## 2017-05-29 RX ADMIN — Medication 975 MILLIGRAM(S): at 06:33

## 2017-05-29 RX ADMIN — Medication 100 MILLIGRAM(S): at 17:12

## 2017-05-29 NOTE — PROGRESS NOTE ADULT - SUBJECTIVE AND OBJECTIVE BOX
Pt seen and examined earlier today. Was feeling well. Was about to get up and work with physical therapy, but bp was low. Hasn't been drinking much water. NO dizziness/lightheadedness.      Review of system- All 10 systems reviewed and is as per HPI otherwise negative.           T(C): 36.8, Max: 37.4 (05-28 @ 17:02)  HR: 78 (73 - 91)  BP: 98/40 (89/44 - 121/43)  RR: 16 (16 - 16)  SpO2: 99% (95% - 99%)  Wt(kg): --    PHYSICAL EXAM:    GENERAL: Comfortable, no acute distress  HEAD:  Atraumatic, Normocephalic  EYES: EOMI, PERRLA  HEENT:dry mucous membranes  NECK: Supple, No JVD  NERVOUS SYSTEM:  Alert & Oriented X3, Motor Strength 5/5 B/L upper and lower extremities  CHEST/LUNG: Clear to auscultation bilaterally  HEART: Regular rate and rhythm; No murmurs, rubs, or gallops  ABDOMEN: Soft, Nontender, Nondistended; Bowel sounds present  GENITOURINARY- Voiding, no palpable bladder  EXTREMITIES:  No clubbing, cyanosis, or edema  MUSCULOSKELTAL- minimal b/l hip tenderness.   SKIN-no rash        LABS:                        9.3    7.6   )-----------( 177      ( 29 May 2017 06:15 )             27.4     05-29    143  |  110<H>  |  8   ----------------------------<  109<H>  3.8   |  26  |  0.43<L>    Ca    7.8<L>      29 May 2017 06:15      PT/INR - ( 29 May 2017 06:15 )   PT: 33.6 sec;   INR: 3.04 ratio           MEDS  sodium chloride 0.9% lock flush 3milliLiter(s) IV Push every 8 hours  sodium chloride 0.9%. 1000milliLiter(s) IV Continuous <Continuous>  acetaminophen   Tablet 650milliGRAM(s) Oral every 6 hours PRN  acetaminophen   Tablet. 650milliGRAM(s) Oral every 6 hours PRN  oxyCODONE IR 5milliGRAM(s) Oral every 4 hours PRN  oxyCODONE IR 10milliGRAM(s) Oral every 4 hours PRN  HYDROmorphone  Injectable 0.5milliGRAM(s) IV Push every 4 hours PRN  aluminum hydroxide/magnesium hydroxide/simethicone Suspension 30milliLiter(s) Oral four times a day PRN  ondansetron Injectable 4milliGRAM(s) IV Push every 6 hours PRN  calcium carbonate 1250 mG + Vitamin D (OsCal 500 + D) 1Tablet(s) Oral three times a day  pantoprazole    Tablet 40milliGRAM(s) Oral daily  diphenhydrAMINE   Capsule 25milliGRAM(s) Oral at bedtime PRN  polyethylene glycol 3350 17Gram(s) Oral daily  bisacodyl Suppository 10milliGRAM(s) Rectal daily PRN  senna 2Tablet(s) Oral at bedtime PRN  ferrous    sulfate 325milliGRAM(s) Oral three times a day with meals  folic acid 1milliGRAM(s) Oral daily  multivitamin 1Tablet(s) Oral daily  ascorbic acid 500milliGRAM(s) Oral two times a day  amitriptyline 75milliGRAM(s) Oral at bedtime  bethanechol 50milliGRAM(s) Oral three times a day  clonazePAM Tablet 2milliGRAM(s) Oral at bedtime  labetalol 100milliGRAM(s) Oral daily  lamoTRIgine 150milliGRAM(s) Oral daily  PARoxetine 20milliGRAM(s) Oral at bedtime  QUEtiapine 200milliGRAM(s) Oral two times a day  acetaminophen   Tablet 975milliGRAM(s) Oral every 6 hours  oxyCODONE ER Tablet 10milliGRAM(s) Oral every 12 hours  celecoxib 200milliGRAM(s) Oral with breakfast  oxyCODONE IR 5milliGRAM(s) Oral every 4 hours PRN  oxyCODONE IR 10milliGRAM(s) Oral every 4 hours PRN  HYDROmorphone  Injectable 0.5milliGRAM(s) IV Push every 3 hours PRN  docusate sodium 100milliGRAM(s) Oral every 12 hours  diphenhydrAMINE   Injectable 25milliGRAM(s) IV Push every 4 hours PRN  warfarin 0.5milliGRAM(s) Oral daily

## 2017-05-29 NOTE — PROGRESS NOTE ADULT - ASSESSMENT
67 yo female S/P B/L THR, POD#0, high thrombosis risk due to surgery, immobility    decrease coumadin to  .5 mg when inr is less then 2.8 adjust with inr for six weeks post procedure  dc Heparin    daily cbc, bmp, INR    venodynes BLe  INC mobility as anne     will follow

## 2017-05-29 NOTE — PROGRESS NOTE ADULT - ASSESSMENT
67yo/F with PMH anxiety/depression, TBI, SSS s/p PPM,  urinary retention, sleep apnea, spinal stenosis, HTN, OA presented for elective b/l THR. Medical consult called for postop medical management    1. B/l Hip OA:  -s/p b/l hip replacement POD#3  -pain control  -physical therapy  -dc mccarthy    2. Anxiety/depression:  -stable.  -continue home meds    3.h/o htn now with hypotension:  -possibly due to hypovolemia/pain meds  -iv bolus 250cc then maintain ivf @125/hour.   -continue bb if bp improved    4. Spinal stenosis:  -pain control/PT    5. SHAYNE:  -used mouthpiece per documentation    6. urinary retention:  -continue bethanachol  -tov    7. Acute blood loss anemia:  -improved with transfusion.    8. DVT px.

## 2017-05-29 NOTE — PROGRESS NOTE ADULT - SUBJECTIVE AND OBJECTIVE BOX
Pt S&E. Pain controlled. No acute events overnight  AVSS  Gen: NAD  Right Lower Extremity:  Dsg c/d/i  SILT L2-S1  +EHL/FHL/TA/Gastroc  DP+  Soft compartments, - calf ttp    Left Lower Extremity:  Dsg c/d/i  SILT L2-S1  +EHL/FHL/TA/Gastroc  DP+  Soft compartments, - calf ttp

## 2017-05-29 NOTE — PROGRESS NOTE ADULT - SUBJECTIVE AND OBJECTIVE BOX
HPI:  Patient is a 66y old  Female who presents with a chief complaint of inc B/L hip pain, OA, now S/P  b/l hip replacements (26 May 2017 11:55)  H/O arrythmia, no AC, on Labetolol, has PPM    Consulted by Dr. Knott    for VTE prophylaxis, risk stratification, and anticoagulation management.    PAST MEDICAL & SURGICAL HISTORY:  TBI (traumatic brain injury): personal history of TBI  Constipation  Urine retention  Arrhythmia: pacemaker placed in 2011  Pacemaker  Sleep apnea: uses mouthpiece  Lumbar stenosis  Anxiety  HTN (hypertension)  Heart murmur  [place] palsy  Hearing loss: slight  Osteoarthritis of both hips  Lyme Disease  Clinical Depression  S/P ORIF (open reduction internal fixation) fracture: right leg - 2011  H/O shoulder surgery: fanta procedure - 1986  S/P Laparoscopic Cholecystectomy: 15 yrs ago  S/P Arthroscopy of Left Knee: 1990&#x27;s  S/P Rhinoplasty: 1970&#x27;s  History of Tonsillectomy: age 22    CrCl: 100.8    Caprini VTE Risk Score: #8    IMPROVE Bleeding Risk Score #1.5  5-29-17 pt seen at bedside on 2n states he right leg hurts more then her left.  Discussed her anticoagulation with coumadin.  Questions answered will reinforce as needed. States she will go to rehab  Falls Risk:   High (X  )  Mod (  )  Low (  )      FAMILY HISTORY:  No pertinent family history in first degree relatives    Denies any personal or familial history of clotting or bleeding disorders.    Allergies    erythromycin (Rash)    Intolerances        REVIEW OF SYSTEMS    (  )Fever	     (  )Constipation	(  )SOB				(  )Headache	(  )Dysuria  (  )Chills	     (  )Melena	(  )Dyspnea present on exertion	                    (  )Dizziness                    (  )Polyuria  (  )Nausea	     (  )Hematochezia	(  )Cough			                    (  )Syncope   	(  )Hematuria  (  )Vomiting    (  )Chest Pain	(  )Wheezing			(  )Weakness  (  )Diarrhea     (  )Palpitations	(  )Anorexia			(  )Myalgia    All other review of systems negative: Yes          PHYSICAL EXAM:    Constitutional: Appears Well    Neurological: A& O x 3    Skin: Warm    Respiratory and Thorax: normal effort; Breath sounds: normal; No rales/wheezing/rhonchi  	  Cardiovascular: S1, S2, regular, NMBR	    Gastrointestinal: BS + x 4Q, nontender	    Genitourinary:  Bladder nondistended, nontender    Musculoskeletal:   General Right:   + muscle/joint tenderness,   normal tone, no joint swelling,   ROM: limited	    General Left:   + muscle/joint tenderness,   normal tone, no joint swelling,   ROM: limited    Hip:  Right: Dressing CDI; Drain:             Left: Dressing CDI; Drain:           Lower extrems:   Right: no calf tenderness              negative julian's sign               + pedal pulses    Left:   no calf tenderness              negative julian's sign               + pedal pulses                          9.3    7.6   )-----------( 177      ( 29 May 2017 06:15 )             27.4       05-29    143  |  110<H>  |  8   ----------------------------<  109<H>  3.8   |  26  |  0.43<L>    Ca    7.8<L>      29 May 2017 06:15            PT/INR - ( 29 May 2017 06:15 )   PT: 33.6 sec;   INR: 3.04 ratio  PT/INR - ( 26 May 2017 12:43 )   PT: 11.6 sec;   INR: 1.07 ratio      		    MEDICATIONS  (STANDING):  sodium chloride 0.9% lock flush 3milliLiter(s) IV Push every 8 hours  sodium chloride 0.9%. 1000milliLiter(s) IV Continuous <Continuous>  calcium carbonate 1250 mG + Vitamin D (OsCal 500 + D) 1Tablet(s) Oral three times a day  pantoprazole    Tablet 40milliGRAM(s) Oral daily  polyethylene glycol 3350 17Gram(s) Oral daily  ferrous    sulfate 325milliGRAM(s) Oral three times a day with meals  folic acid 1milliGRAM(s) Oral daily  multivitamin 1Tablet(s) Oral daily  ascorbic acid 500milliGRAM(s) Oral two times a day  amitriptyline 75milliGRAM(s) Oral at bedtime  bethanechol 50milliGRAM(s) Oral three times a day  clonazePAM Tablet 2milliGRAM(s) Oral at bedtime  labetalol 100milliGRAM(s) Oral daily  lamoTRIgine 150milliGRAM(s) Oral daily  PARoxetine 20milliGRAM(s) Oral at bedtime  QUEtiapine 200milliGRAM(s) Oral two times a day  acetaminophen   Tablet 975milliGRAM(s) Oral every 6 hours  oxyCODONE ER Tablet 10milliGRAM(s) Oral every 12 hours  celecoxib 200milliGRAM(s) Oral with breakfast  docusate sodium 100milliGRAM(s) Oral every 12 hours  warfarin 0.5milliGRAM(s) Oral daily      DVT Prophylaxis:  LMWH                   (  )  Heparin SQ           (  )  Coumadin             (x  )  Xarelto                  (  )  Eliquis                   (  )  Venodynes           (  x)  Ambulation          ( x )  UFH                       (  )  Contraindicated  (  )

## 2017-05-30 VITALS
RESPIRATION RATE: 16 BRPM | HEART RATE: 88 BPM | SYSTOLIC BLOOD PRESSURE: 124 MMHG | DIASTOLIC BLOOD PRESSURE: 50 MMHG | TEMPERATURE: 99 F | OXYGEN SATURATION: 95 %

## 2017-05-30 LAB
ANION GAP SERPL CALC-SCNC: 5 MMOL/L — SIGNIFICANT CHANGE UP (ref 5–17)
ANISOCYTOSIS BLD QL: SLIGHT — SIGNIFICANT CHANGE UP
BASOPHILS # BLD AUTO: SIGNIFICANT CHANGE UP K/UL (ref 0–0.2)
BASOPHILS NFR BLD AUTO: 0 % — SIGNIFICANT CHANGE UP (ref 0–2)
BUN SERPL-MCNC: 6 MG/DL — LOW (ref 7–23)
CALCIUM SERPL-MCNC: 8 MG/DL — LOW (ref 8.5–10.1)
CHLORIDE SERPL-SCNC: 108 MMOL/L — SIGNIFICANT CHANGE UP (ref 96–108)
CO2 SERPL-SCNC: 29 MMOL/L — SIGNIFICANT CHANGE UP (ref 22–31)
CREAT SERPL-MCNC: 0.39 MG/DL — LOW (ref 0.5–1.3)
EOSINOPHIL # BLD AUTO: SIGNIFICANT CHANGE UP K/UL (ref 0–0.5)
EOSINOPHIL NFR BLD AUTO: 7 % — HIGH (ref 0–6)
GLUCOSE SERPL-MCNC: 110 MG/DL — HIGH (ref 70–99)
HCT VFR BLD CALC: 29.2 % — LOW (ref 34.5–45)
HGB BLD-MCNC: 9.4 G/DL — LOW (ref 11.5–15.5)
HYPOCHROMIA BLD QL: SLIGHT — SIGNIFICANT CHANGE UP
INR BLD: 1.8 RATIO — HIGH (ref 0.88–1.16)
LYMPHOCYTES # BLD AUTO: 17 % — SIGNIFICANT CHANGE UP (ref 13–44)
LYMPHOCYTES # BLD AUTO: SIGNIFICANT CHANGE UP K/UL (ref 1–3.3)
MAGNESIUM SERPL-MCNC: 1.9 MG/DL — SIGNIFICANT CHANGE UP (ref 1.6–2.6)
MANUAL DIF COMMENT BLD-IMP: SIGNIFICANT CHANGE UP
MCHC RBC-ENTMCNC: 30.9 PG — SIGNIFICANT CHANGE UP (ref 27–34)
MCHC RBC-ENTMCNC: 32.1 GM/DL — SIGNIFICANT CHANGE UP (ref 32–36)
MCV RBC AUTO: 96.3 FL — SIGNIFICANT CHANGE UP (ref 80–100)
MONOCYTES # BLD AUTO: SIGNIFICANT CHANGE UP K/UL (ref 0–0.9)
MONOCYTES NFR BLD AUTO: 9 % — SIGNIFICANT CHANGE UP (ref 2–14)
NEUTROPHILS # BLD AUTO: SIGNIFICANT CHANGE UP K/UL (ref 1.8–7.4)
NEUTROPHILS NFR BLD AUTO: 65 % — SIGNIFICANT CHANGE UP (ref 43–77)
NEUTS BAND # BLD: 2 % — SIGNIFICANT CHANGE UP (ref 0–8)
PHOSPHATE SERPL-MCNC: 1.9 MG/DL — LOW (ref 2.5–4.5)
PLAT MORPH BLD: NORMAL — SIGNIFICANT CHANGE UP
PLATELET # BLD AUTO: 239 K/UL — SIGNIFICANT CHANGE UP (ref 150–400)
PLATELET COUNT - ESTIMATE: NORMAL — SIGNIFICANT CHANGE UP
POTASSIUM SERPL-MCNC: 3.9 MMOL/L — SIGNIFICANT CHANGE UP (ref 3.5–5.3)
POTASSIUM SERPL-SCNC: 3.9 MMOL/L — SIGNIFICANT CHANGE UP (ref 3.5–5.3)
PROTHROM AB SERPL-ACNC: 19.7 SEC — HIGH (ref 9.8–12.7)
RBC # BLD: 3.04 M/UL — LOW (ref 3.8–5.2)
RBC # FLD: 12.8 % — SIGNIFICANT CHANGE UP (ref 10.3–14.5)
RBC BLD AUTO: NORMAL — SIGNIFICANT CHANGE UP
ROULEAUX BLD QL SMEAR: PRESENT — SIGNIFICANT CHANGE UP
SODIUM SERPL-SCNC: 142 MMOL/L — SIGNIFICANT CHANGE UP (ref 135–145)
WBC # BLD: 7.6 K/UL — SIGNIFICANT CHANGE UP (ref 3.8–10.5)
WBC # FLD AUTO: 7.6 K/UL — SIGNIFICANT CHANGE UP (ref 3.8–10.5)

## 2017-05-30 PROCEDURE — 99233 SBSQ HOSP IP/OBS HIGH 50: CPT

## 2017-05-30 RX ORDER — WARFARIN SODIUM 2.5 MG/1
1.5 TABLET ORAL DAILY
Qty: 0 | Refills: 0 | Status: DISCONTINUED | OUTPATIENT
Start: 2017-05-30 | End: 2017-05-30

## 2017-05-30 RX ORDER — WARFARIN SODIUM 2.5 MG/1
0.5 TABLET ORAL
Qty: 0 | Refills: 0 | COMMUNITY

## 2017-05-30 RX ORDER — DOCUSATE SODIUM 100 MG
1 CAPSULE ORAL
Qty: 0 | Refills: 0 | COMMUNITY

## 2017-05-30 RX ADMIN — PANTOPRAZOLE SODIUM 40 MILLIGRAM(S): 20 TABLET, DELAYED RELEASE ORAL at 12:34

## 2017-05-30 RX ADMIN — OXYCODONE HYDROCHLORIDE 5 MILLIGRAM(S): 5 TABLET ORAL at 01:51

## 2017-05-30 RX ADMIN — POLYETHYLENE GLYCOL 3350 17 GRAM(S): 17 POWDER, FOR SOLUTION ORAL at 12:33

## 2017-05-30 RX ADMIN — Medication 1 TABLET(S): at 14:27

## 2017-05-30 RX ADMIN — LAMOTRIGINE 150 MILLIGRAM(S): 25 TABLET, ORALLY DISINTEGRATING ORAL at 12:32

## 2017-05-30 RX ADMIN — SODIUM CHLORIDE 125 MILLILITER(S): 9 INJECTION INTRAMUSCULAR; INTRAVENOUS; SUBCUTANEOUS at 05:55

## 2017-05-30 RX ADMIN — SODIUM CHLORIDE 3 MILLILITER(S): 9 INJECTION INTRAMUSCULAR; INTRAVENOUS; SUBCUTANEOUS at 14:27

## 2017-05-30 RX ADMIN — Medication 100 MILLIGRAM(S): at 05:55

## 2017-05-30 RX ADMIN — SODIUM CHLORIDE 3 MILLILITER(S): 9 INJECTION INTRAMUSCULAR; INTRAVENOUS; SUBCUTANEOUS at 05:55

## 2017-05-30 RX ADMIN — Medication 325 MILLIGRAM(S): at 12:35

## 2017-05-30 RX ADMIN — CELECOXIB 200 MILLIGRAM(S): 200 CAPSULE ORAL at 08:44

## 2017-05-30 RX ADMIN — Medication 1 TABLET(S): at 05:55

## 2017-05-30 RX ADMIN — Medication 50 MILLIGRAM(S): at 14:28

## 2017-05-30 RX ADMIN — Medication 975 MILLIGRAM(S): at 05:55

## 2017-05-30 RX ADMIN — Medication 325 MILLIGRAM(S): at 08:42

## 2017-05-30 RX ADMIN — Medication 50 MILLIGRAM(S): at 05:55

## 2017-05-30 RX ADMIN — Medication 500 MILLIGRAM(S): at 05:55

## 2017-05-30 RX ADMIN — Medication 1 MILLIGRAM(S): at 12:35

## 2017-05-30 RX ADMIN — OXYCODONE HYDROCHLORIDE 10 MILLIGRAM(S): 5 TABLET ORAL at 08:43

## 2017-05-30 RX ADMIN — Medication 1 TABLET(S): at 12:34

## 2017-05-30 RX ADMIN — QUETIAPINE FUMARATE 200 MILLIGRAM(S): 200 TABLET, FILM COATED ORAL at 05:55

## 2017-05-30 RX ADMIN — OXYCODONE HYDROCHLORIDE 5 MILLIGRAM(S): 5 TABLET ORAL at 00:55

## 2017-05-30 NOTE — PROGRESS NOTE ADULT - SUBJECTIVE AND OBJECTIVE BOX
Pt seen and examined this am. Voiding since mccarthy removal. pain controlled. No new complaints.     Review of system- All 10 systems reviewed and is as per HPI otherwise negative.       T(C): 37.2, Max: 37.2 (05-30 @ 07:11)  HR: 88 (79 - 88)  BP: 124/50 (98/40 - 124/50)  RR: 16 (16 - 16)  SpO2: 95% (95% - 99%)    PHYSICAL EXAM:    GENERAL: Comfortable, no acute distress  HEAD:  Atraumatic, Normocephalic  EYES: EOMI, PERRLA  HEENT: Moist mucous membranes  NECK: Supple, No JVD  NERVOUS SYSTEM:  Alert & Oriented X3, nonfocal  CHEST/LUNG: Clear to auscultation bilaterally  HEART: Regular rate and rhythm  ABDOMEN: Soft, Nontender, Nondistended; Bowel sounds present  GENITOURINARY- Voiding, no palpable bladder  EXTREMITIES:  No clubbing, cyanosis, or edema  MUSCULOSKELTAL- b/l hip tenderness.   SKIN-no rash        LABS:                        9.4    7.6   )-----------( 239      ( 30 May 2017 06:16 )             29.2     05-30    142  |  108  |  6<L>  ----------------------------<  110<H>  3.9   |  29  |  0.39<L>    Ca    8.0<L>      30 May 2017 06:16  Phos  1.9     05-30  Mg     1.9     05-30      PT/INR - ( 30 May 2017 10:51 )   PT: 19.7 sec;   INR: 1.80 ratio           MEDS  sodium chloride 0.9% lock flush 3milliLiter(s) IV Push every 8 hours  acetaminophen   Tablet 650milliGRAM(s) Oral every 6 hours PRN  acetaminophen   Tablet. 650milliGRAM(s) Oral every 6 hours PRN  oxyCODONE IR 5milliGRAM(s) Oral every 4 hours PRN  oxyCODONE IR 10milliGRAM(s) Oral every 4 hours PRN  HYDROmorphone  Injectable 0.5milliGRAM(s) IV Push every 4 hours PRN  aluminum hydroxide/magnesium hydroxide/simethicone Suspension 30milliLiter(s) Oral four times a day PRN  ondansetron Injectable 4milliGRAM(s) IV Push every 6 hours PRN  calcium carbonate 1250 mG + Vitamin D (OsCal 500 + D) 1Tablet(s) Oral three times a day  pantoprazole    Tablet 40milliGRAM(s) Oral daily  diphenhydrAMINE   Capsule 25milliGRAM(s) Oral at bedtime PRN  polyethylene glycol 3350 17Gram(s) Oral daily  bisacodyl Suppository 10milliGRAM(s) Rectal daily PRN  senna 2Tablet(s) Oral at bedtime PRN  ferrous    sulfate 325milliGRAM(s) Oral three times a day with meals  folic acid 1milliGRAM(s) Oral daily  multivitamin 1Tablet(s) Oral daily  ascorbic acid 500milliGRAM(s) Oral two times a day  amitriptyline 75milliGRAM(s) Oral at bedtime  bethanechol 50milliGRAM(s) Oral three times a day  clonazePAM Tablet 2milliGRAM(s) Oral at bedtime  labetalol 100milliGRAM(s) Oral daily  lamoTRIgine 150milliGRAM(s) Oral daily  PARoxetine 20milliGRAM(s) Oral at bedtime  QUEtiapine 200milliGRAM(s) Oral two times a day  oxyCODONE IR 5milliGRAM(s) Oral every 4 hours PRN  oxyCODONE IR 10milliGRAM(s) Oral every 4 hours PRN  HYDROmorphone  Injectable 0.5milliGRAM(s) IV Push every 3 hours PRN  docusate sodium 100milliGRAM(s) Oral every 12 hours  diphenhydrAMINE   Injectable 25milliGRAM(s) IV Push every 4 hours PRN  warfarin 0.5milliGRAM(s) Oral daily

## 2017-05-30 NOTE — PROGRESS NOTE ADULT - SUBJECTIVE AND OBJECTIVE BOX
Pt S&E. Pain controlled. No acute events overnight    Vital Signs Last 24 Hrs  T(C): 36.9, Max: 37.1 (05-29 @ 15:38)  T(F): 98.5, Max: 98.8 (05-29 @ 15:38)  HR: 84 (73 - 85)  BP: 109/50 (89/44 - 117/56)  BP(mean): --  RR: 16 (16 - 16)  SpO2: 99% (96% - 99%)    Gen: NAD  Right Lower Extremity:  Dsg c/d/i  SILT L2-S1  +EHL/FHL/TA/Gastroc  DP+  Soft compartments, - calf ttp    Left Lower Extremity:  Dsg c/d/i  SILT L2-S1  +EHL/FHL/TA/Gastroc  DP+  Soft compartments, - calf ttp

## 2017-05-30 NOTE — PROGRESS NOTE ADULT - SUBJECTIVE AND OBJECTIVE BOX
HPI:  Patient is a 66y old  Female who presents with a chief complaint of inc B/L hip pain, OA, now S/P  b/l hip replacements (26 May 2017 11:55)  H/O arrythmia, no AC, on Labetolol, has PPM    Consulted by Dr. Knott    for VTE prophylaxis, risk stratification, and anticoagulation management.    PAST MEDICAL & SURGICAL HISTORY:  TBI (traumatic brain injury): personal history of TBI  Constipation  Urine retention  Arrhythmia: pacemaker placed in 2011  Pacemaker  Sleep apnea: uses mouthpiece  Lumbar stenosis  Anxiety  HTN (hypertension)  Heart murmur  [place] palsy  Hearing loss: slight  Osteoarthritis of both hips  Lyme Disease  Clinical Depression  S/P ORIF (open reduction internal fixation) fracture: right leg - 2011  H/O shoulder surgery: fanta procedure - 1986  S/P Laparoscopic Cholecystectomy: 15 yrs ago  S/P Arthroscopy of Left Knee: 1990&#x27;s  S/P Rhinoplasty: 1970&#x27;s  History of Tonsillectomy: age 22    CrCl: 100.8    Caprini VTE Risk Score: #8    IMPROVE Bleeding Risk Score #1.5  5-29-17 pt seen at bedside on 2n states he right leg hurts more then her left.  Discussed her anticoagulation with coumadin.  Questions answered will reinforce as needed. States she will go to rehab  5/30/17 pt jodie hernandes bedside oob in chair.  States she is going to rehab today at [facility].  Discussed her anticoagulation with coumadin.  No concerns except right hip hurts more then left.   Falls Risk:   High (X  )  Mod (  )  Low (  )      FAMILY HISTORY:  No pertinent family history in first degree relatives    Denies any personal or familial history of clotting or bleeding disorders.    Allergies    erythromycin (Rash)    Intolerances        REVIEW OF SYSTEMS    (  )Fever	     (  )Constipation	(  )SOB				(  )Headache	(  )Dysuria  (  )Chills	     (  )Melena	(  )Dyspnea present on exertion	                    (  )Dizziness                    (  )Polyuria  (  )Nausea	     (  )Hematochezia	(  )Cough			                    (  )Syncope   	(  )Hematuria  (  )Vomiting    (  )Chest Pain	(  )Wheezing			(  )Weakness  (  )Diarrhea     (  )Palpitations	(  )Anorexia			(  )Myalgia    All other review of systems negative: Yes    PHYSICAL EXAM:    Constitutional: Appears Well    Neurological: A& O x 3    Skin: Warm    Respiratory and Thorax: normal effort; Breath sounds: normal; No rales/wheezing/rhonchi  	  Cardiovascular: S1, S2, regular, NMBR	    Gastrointestinal: BS + x 4Q, nontender	    Genitourinary:  Bladder nondistended, nontender    Musculoskeletal:   General Right:   + muscle/joint tenderness,   normal tone, no joint swelling,   ROM: limited	    General Left:   + muscle/joint tenderness,   normal tone, no joint swelling,   ROM: limited    Hip:  Right: Dressing CDI; Drain:             Left: Dressing CDI; Drain:           Lower extrems:   Right: no calf tenderness              negative julian's sign               + pedal pulses    Left:   no calf tenderness              negative julian's sign               + pedal pulses                          9.4    7.6   )-----------( 239      ( 30 May 2017 06:16 )             29.2       05-30    142  |  108  |  6<L>  ----------------------------<  110<H>  3.9   |  29  |  0.39<L>    Ca    8.0<L>      30 May 2017 06:16  Phos  1.9     05-30  Mg     1.9     05-30                              9.3    7.6   )-----------( 177      ( 29 May 2017 06:15 )             27.4       05-29    143  |  110<H>  |  8   ----------------------------<  109<H>  3.8   |  26  |  0.43<L>    Ca    7.8<L>      29 May 2017 06:15       PT/INR PENDING TODAY      PT/INR - ( 29 May 2017 06:15 )   PT: 33.6 sec;   INR: 3.04 ratio  PT/INR - ( 26 May 2017 12:43 )   PT: 11.6 sec;   INR: 1.07 ratio      		  MEDICATIONS  (STANDING):  sodium chloride 0.9% lock flush 3milliLiter(s) IV Push every 8 hours  calcium carbonate 1250 mG + Vitamin D (OsCal 500 + D) 1Tablet(s) Oral three times a day  pantoprazole    Tablet 40milliGRAM(s) Oral daily  polyethylene glycol 3350 17Gram(s) Oral daily  ferrous    sulfate 325milliGRAM(s) Oral three times a day with meals  folic acid 1milliGRAM(s) Oral daily  multivitamin 1Tablet(s) Oral daily  ascorbic acid 500milliGRAM(s) Oral two times a day  amitriptyline 75milliGRAM(s) Oral at bedtime  bethanechol 50milliGRAM(s) Oral three times a day  clonazePAM Tablet 2milliGRAM(s) Oral at bedtime  labetalol 100milliGRAM(s) Oral daily  lamoTRIgine 150milliGRAM(s) Oral daily  PARoxetine 20milliGRAM(s) Oral at bedtime  QUEtiapine 200milliGRAM(s) Oral two times a day  docusate sodium 100milliGRAM(s) Oral every 12 hours  warfarin 0.5milliGRAM(s) Oral daily          DVT Prophylaxis:  LMWH                   (  )  Heparin SQ           (  )  Coumadin             (x  )  Xarelto                  (  )  Eliquis                   (  )  Venodynes           (  x)  Ambulation          ( x )  UFH                       (  )  Contraindicated  (  )

## 2017-05-30 NOTE — PROGRESS NOTE ADULT - ASSESSMENT
67yo/F with PMH anxiety/depression, TBI, SSS s/p PPM,  urinary retention, sleep apnea, spinal stenosis, HTN, OA presented for elective b/l THR. Medical consult called for postop medical management    1. B/l Hip OA:  -s/p b/l hip replacement POD#4  -pain control  -physical therapy      2. Anxiety/depression:  -stable.  -continue home meds    3.h/o htn   -stable.     4. Spinal stenosis:  -pain control/PT    5. SHAYNE:  -used mouthpiece per documentation    6. Urinary retention:  -continue bethanachol    7. Acute blood loss anemia:  -improved with transfusion.    8. DVT px.
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
## 2017-06-01 LAB — SURGICAL PATHOLOGY FINAL REPORT - CH: SIGNIFICANT CHANGE UP

## 2017-06-02 DIAGNOSIS — I95.9 HYPOTENSION, UNSPECIFIED: ICD-10-CM

## 2017-06-02 DIAGNOSIS — I49.5 SICK SINUS SYNDROME: ICD-10-CM

## 2017-06-02 DIAGNOSIS — G47.33 OBSTRUCTIVE SLEEP APNEA (ADULT) (PEDIATRIC): ICD-10-CM

## 2017-06-02 DIAGNOSIS — M16.0 BILATERAL PRIMARY OSTEOARTHRITIS OF HIP: ICD-10-CM

## 2017-06-02 DIAGNOSIS — M48.00 SPINAL STENOSIS, SITE UNSPECIFIED: ICD-10-CM

## 2017-06-02 DIAGNOSIS — E78.5 HYPERLIPIDEMIA, UNSPECIFIED: ICD-10-CM

## 2017-06-02 DIAGNOSIS — D62 ACUTE POSTHEMORRHAGIC ANEMIA: ICD-10-CM

## 2017-06-02 DIAGNOSIS — I10 ESSENTIAL (PRIMARY) HYPERTENSION: ICD-10-CM

## 2017-06-02 DIAGNOSIS — G51.0 BELL'S PALSY: ICD-10-CM

## 2017-06-02 DIAGNOSIS — Z95.0 PRESENCE OF CARDIAC PACEMAKER: ICD-10-CM

## 2017-06-02 DIAGNOSIS — F32.9 MAJOR DEPRESSIVE DISORDER, SINGLE EPISODE, UNSPECIFIED: ICD-10-CM

## 2017-09-24 ENCOUNTER — EMERGENCY (EMERGENCY)
Facility: HOSPITAL | Age: 67
LOS: 0 days | Discharge: HOME | End: 2017-09-24
Attending: EMERGENCY MEDICINE | Admitting: EMERGENCY MEDICINE
Payer: MEDICARE

## 2017-09-24 VITALS
OXYGEN SATURATION: 100 % | HEART RATE: 81 BPM | RESPIRATION RATE: 18 BRPM | SYSTOLIC BLOOD PRESSURE: 108 MMHG | TEMPERATURE: 97 F | DIASTOLIC BLOOD PRESSURE: 47 MMHG

## 2017-09-24 VITALS — WEIGHT: 134.92 LBS

## 2017-09-24 DIAGNOSIS — M54.5 LOW BACK PAIN: ICD-10-CM

## 2017-09-24 DIAGNOSIS — M54.9 DORSALGIA, UNSPECIFIED: ICD-10-CM

## 2017-09-24 DIAGNOSIS — R20.2 PARESTHESIA OF SKIN: ICD-10-CM

## 2017-09-24 DIAGNOSIS — M25.551 PAIN IN RIGHT HIP: ICD-10-CM

## 2017-09-24 DIAGNOSIS — Z98.890 OTHER SPECIFIED POSTPROCEDURAL STATES: Chronic | ICD-10-CM

## 2017-09-24 DIAGNOSIS — Z96.7 PRESENCE OF OTHER BONE AND TENDON IMPLANTS: Chronic | ICD-10-CM

## 2017-09-24 PROCEDURE — 99284 EMERGENCY DEPT VISIT MOD MDM: CPT

## 2017-09-24 NOTE — ED ADULT TRIAGE NOTE - CHIEF COMPLAINT QUOTE
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

## 2017-09-24 NOTE — ED ADULT NURSE NOTE - CHIEF COMPLAINT QUOTE
pt c/p right leg pain and numbness that developed this morning, pt states she had bilateral hip replacement in mayr 2017 Hx of MRSA in may, pt able to ambulate with mild discomofrt

## 2017-09-24 NOTE — ED STATDOCS - OBJECTIVE STATEMENT
65 y/o female with PMHx of HTN, depression, anxiety, arrhythmia s/p pacemaker, B/L hip replacement (5/26 by Dr. Knott) presents to the ED c/o right hip pain starting 1 month ago, worsens when physical therapist dopes exercises involving right lower back. +right lower back pain. States now she is experiencing tingling in her right foot, moving upward to knee. Dr. Knott gave pt a prescription for more PT but pt believes this may be aggravating her sx. Ambulates with a walker. Last CT scan prior to surgery. Follows Dr. Chaney, spine and pt states she has issues with her spine. Next f/u with Dr. Knott, ortho next month.

## 2017-09-24 NOTE — ED STATDOCS - MEDICAL DECISION MAKING DETAILS
No red flags for back pain, including no fever, no hx of IVDU, no hx of CA, no trauma, no recent surgery to spine, no urinary retention, no bowel or bladder incontinence, no saddle anesthesia, no sensory or motor neurologic deficit.  Thus no concern for epidural abscess, epidural hematoma, cauda equina, vertebral fracture, or any other emergent cause for back pain.  Diagnosis of back pain, likely musculoskeletal in nature or sciatica.  Recommend rest, NSAIDs, muscle relaxants, no heavy lifting.  Follow up with PCP and Dr. Chaney in 1 week.  Return precautions given.

## 2017-12-22 ENCOUNTER — APPOINTMENT (OUTPATIENT)
Dept: OBGYN | Facility: CLINIC | Age: 67
End: 2017-12-22

## 2018-01-12 ENCOUNTER — EMERGENCY (EMERGENCY)
Facility: HOSPITAL | Age: 68
LOS: 0 days | Discharge: HOME | End: 2018-01-12
Attending: EMERGENCY MEDICINE | Admitting: EMERGENCY MEDICINE
Payer: COMMERCIAL

## 2018-01-12 VITALS
SYSTOLIC BLOOD PRESSURE: 125 MMHG | RESPIRATION RATE: 16 BRPM | OXYGEN SATURATION: 96 % | DIASTOLIC BLOOD PRESSURE: 73 MMHG | HEART RATE: 83 BPM | HEIGHT: 65 IN | WEIGHT: 160.06 LBS | TEMPERATURE: 99 F

## 2018-01-12 DIAGNOSIS — Z98.890 OTHER SPECIFIED POSTPROCEDURAL STATES: Chronic | ICD-10-CM

## 2018-01-12 DIAGNOSIS — Z96.7 PRESENCE OF OTHER BONE AND TENDON IMPLANTS: Chronic | ICD-10-CM

## 2018-01-12 DIAGNOSIS — M54.9 DORSALGIA, UNSPECIFIED: ICD-10-CM

## 2018-01-12 DIAGNOSIS — M54.5 LOW BACK PAIN: ICD-10-CM

## 2018-01-12 DIAGNOSIS — M54.2 CERVICALGIA: ICD-10-CM

## 2018-01-12 PROCEDURE — 70450 CT HEAD/BRAIN W/O DYE: CPT | Mod: 26

## 2018-01-12 PROCEDURE — 72190 X-RAY EXAM OF PELVIS: CPT | Mod: 26

## 2018-01-12 PROCEDURE — 71045 X-RAY EXAM CHEST 1 VIEW: CPT | Mod: 26

## 2018-01-12 PROCEDURE — 99285 EMERGENCY DEPT VISIT HI MDM: CPT

## 2018-01-12 PROCEDURE — 72070 X-RAY EXAM THORAC SPINE 2VWS: CPT | Mod: 26

## 2018-01-12 PROCEDURE — 72100 X-RAY EXAM L-S SPINE 2/3 VWS: CPT | Mod: 26

## 2018-01-12 PROCEDURE — 72125 CT NECK SPINE W/O DYE: CPT | Mod: 26

## 2018-01-12 RX ORDER — IBUPROFEN 200 MG
600 TABLET ORAL ONCE
Qty: 0 | Refills: 0 | Status: COMPLETED | OUTPATIENT
Start: 2018-01-12 | End: 2018-01-12

## 2018-01-12 RX ORDER — DIAZEPAM 5 MG
5 TABLET ORAL ONCE
Qty: 0 | Refills: 0 | Status: DISCONTINUED | OUTPATIENT
Start: 2018-01-12 | End: 2018-01-12

## 2018-01-12 RX ORDER — METHOCARBAMOL 500 MG/1
2 TABLET, FILM COATED ORAL
Qty: 20 | Refills: 0 | OUTPATIENT
Start: 2018-01-12 | End: 2018-01-16

## 2018-01-12 RX ADMIN — Medication 600 MILLIGRAM(S): at 11:05

## 2018-01-12 RX ADMIN — Medication 5 MILLIGRAM(S): at 11:05
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
## 2018-01-29 ENCOUNTER — APPOINTMENT (OUTPATIENT)
Dept: OBGYN | Facility: CLINIC | Age: 68
End: 2018-01-29
Payer: MEDICARE

## 2018-01-29 VITALS
HEIGHT: 65 IN | DIASTOLIC BLOOD PRESSURE: 80 MMHG | SYSTOLIC BLOOD PRESSURE: 120 MMHG | BODY MASS INDEX: 26.66 KG/M2 | WEIGHT: 160 LBS

## 2018-01-29 DIAGNOSIS — N95.9 UNSPECIFIED MENOPAUSAL AND PERIMENOPAUSAL DISORDER: ICD-10-CM

## 2018-01-29 PROCEDURE — G0101: CPT

## 2018-01-29 PROCEDURE — 82270 OCCULT BLOOD FECES: CPT

## 2018-03-22 ENCOUNTER — APPOINTMENT (OUTPATIENT)
Dept: RADIOLOGY | Facility: CLINIC | Age: 68
End: 2018-03-22

## 2018-03-27 ENCOUNTER — APPOINTMENT (OUTPATIENT)
Dept: UROLOGY | Facility: CLINIC | Age: 68
End: 2018-03-27
Payer: MEDICARE

## 2018-03-27 VITALS
TEMPERATURE: 98 F | OXYGEN SATURATION: 96 % | WEIGHT: 160 LBS | SYSTOLIC BLOOD PRESSURE: 162 MMHG | HEART RATE: 80 BPM | DIASTOLIC BLOOD PRESSURE: 82 MMHG | HEIGHT: 65 IN | BODY MASS INDEX: 26.66 KG/M2

## 2018-03-27 PROCEDURE — 51798 US URINE CAPACITY MEASURE: CPT

## 2018-03-30 LAB
APPEARANCE: CLEAR
BACTERIA: NEGATIVE
BILIRUBIN URINE: NEGATIVE
BLOOD URINE: NEGATIVE
COLOR: ABNORMAL
GLUCOSE QUALITATIVE U: NEGATIVE MG/DL
HYALINE CASTS: 3 /LPF
KETONES URINE: NEGATIVE
LEUKOCYTE ESTERASE URINE: ABNORMAL
MICROSCOPIC-UA: NORMAL
NITRITE URINE: NEGATIVE
PH URINE: 5.5
PROTEIN URINE: NEGATIVE MG/DL
RED BLOOD CELLS URINE: 6 /HPF
SPECIFIC GRAVITY URINE: 1.02
SQUAMOUS EPITHELIAL CELLS: 1 /HPF
UROBILINOGEN URINE: NEGATIVE MG/DL
WHITE BLOOD CELLS URINE: 5 /HPF

## 2018-03-31 LAB
BACTERIA UR CULT: NORMAL
CORE LAB FLUID CYTOLOGY: NORMAL

## 2018-08-31 PROBLEM — M48.06 SPINAL STENOSIS, LUMBAR REGION: Chronic | Status: ACTIVE | Noted: 2017-05-17

## 2018-08-31 PROBLEM — M16.0 BILATERAL PRIMARY OSTEOARTHRITIS OF HIP: Chronic | Status: ACTIVE | Noted: 2017-05-17

## 2018-08-31 PROBLEM — I49.9 CARDIAC ARRHYTHMIA, UNSPECIFIED: Chronic | Status: ACTIVE | Noted: 2017-05-17

## 2018-08-31 PROBLEM — Z95.0 PRESENCE OF CARDIAC PACEMAKER: Chronic | Status: ACTIVE | Noted: 2017-05-17

## 2018-08-31 PROBLEM — R33.9 RETENTION OF URINE, UNSPECIFIED: Chronic | Status: ACTIVE | Noted: 2017-05-17

## 2018-08-31 PROBLEM — G47.30 SLEEP APNEA, UNSPECIFIED: Chronic | Status: ACTIVE | Noted: 2017-05-17

## 2018-08-31 PROBLEM — H91.90 UNSPECIFIED HEARING LOSS, UNSPECIFIED EAR: Chronic | Status: ACTIVE | Noted: 2017-05-17

## 2018-08-31 PROBLEM — F41.9 ANXIETY DISORDER, UNSPECIFIED: Chronic | Status: ACTIVE | Noted: 2017-05-17

## 2018-08-31 PROBLEM — S06.9X9A UNSPECIFIED INTRACRANIAL INJURY WITH LOSS OF CONSCIOUSNESS OF UNSPECIFIED DURATION, INITIAL ENCOUNTER: Chronic | Status: ACTIVE | Noted: 2017-05-17

## 2018-08-31 PROBLEM — G51.0 BELL'S PALSY: Chronic | Status: ACTIVE | Noted: 2017-05-17

## 2018-08-31 PROBLEM — I10 ESSENTIAL (PRIMARY) HYPERTENSION: Chronic | Status: ACTIVE | Noted: 2017-05-17

## 2018-08-31 PROBLEM — R01.1 CARDIAC MURMUR, UNSPECIFIED: Chronic | Status: ACTIVE | Noted: 2017-05-17

## 2018-08-31 PROBLEM — K59.00 CONSTIPATION, UNSPECIFIED: Chronic | Status: ACTIVE | Noted: 2017-05-17

## 2018-09-20 ENCOUNTER — APPOINTMENT (OUTPATIENT)
Dept: OTOLARYNGOLOGY | Facility: CLINIC | Age: 68
End: 2018-09-20
Payer: MEDICARE

## 2018-09-20 DIAGNOSIS — J32.2 CHRONIC ETHMOIDAL SINUSITIS: ICD-10-CM

## 2018-09-20 DIAGNOSIS — R42 DIZZINESS AND GIDDINESS: ICD-10-CM

## 2018-09-20 DIAGNOSIS — M35.00 SICCA SYNDROME, UNSPECIFIED: ICD-10-CM

## 2018-09-20 DIAGNOSIS — H69.82 OTHER SPECIFIED DISORDERS OF EUSTACHIAN TUBE, LEFT EAR: ICD-10-CM

## 2018-09-20 DIAGNOSIS — Z82.2 FAMILY HISTORY OF DEAFNESS AND HEARING LOSS: ICD-10-CM

## 2018-09-20 DIAGNOSIS — H91.90 UNSPECIFIED HEARING LOSS, UNSPECIFIED EAR: ICD-10-CM

## 2018-09-20 PROCEDURE — 31231 NASAL ENDOSCOPY DX: CPT

## 2018-09-20 PROCEDURE — 99214 OFFICE O/P EST MOD 30 MIN: CPT

## 2018-09-20 PROCEDURE — 99204 OFFICE O/P NEW MOD 45 MIN: CPT | Mod: 25

## 2018-09-20 PROCEDURE — 92567 TYMPANOMETRY: CPT

## 2018-09-20 PROCEDURE — 99213 OFFICE O/P EST LOW 20 MIN: CPT | Mod: 25

## 2018-09-20 PROCEDURE — 92588 EVOKED AUDITORY TST COMPLETE: CPT

## 2018-09-20 PROCEDURE — 92557 COMPREHENSIVE HEARING TEST: CPT

## 2018-10-01 ENCOUNTER — TRANSCRIPTION ENCOUNTER (OUTPATIENT)
Age: 68
End: 2018-10-01

## 2018-10-14 ENCOUNTER — TRANSCRIPTION ENCOUNTER (OUTPATIENT)
Age: 68
End: 2018-10-14

## 2018-10-18 ENCOUNTER — TRANSCRIPTION ENCOUNTER (OUTPATIENT)
Age: 68
End: 2018-10-18

## 2018-11-13 ENCOUNTER — TRANSCRIPTION ENCOUNTER (OUTPATIENT)
Age: 68
End: 2018-11-13

## 2018-11-26 NOTE — ASU PREOP CHECKLIST - CHLOROHEXIDINE WASH 3
[address] [facility] [address]  Phone: [phone]  Fax: [id number], APRN - CNP        November 26, 2018     Patient: Benny Cevallos   YOB: 2006   Date of Visit: 11/26/2018       To Whom it May Concern:    Sherwin Bailey was seen in my clinic on 11/26/2018. He may return to school on 11/27/18. .    If you have any questions or concerns, please don't hesitate to call.     Sincerely,         Page Gtz, APRN - CNP
24-May-2017

## 2019-02-01 ENCOUNTER — APPOINTMENT (OUTPATIENT)
Dept: OBGYN | Facility: CLINIC | Age: 69
End: 2019-02-01
Payer: MEDICARE

## 2019-02-01 VITALS
WEIGHT: 165 LBS | HEIGHT: 65 IN | DIASTOLIC BLOOD PRESSURE: 60 MMHG | SYSTOLIC BLOOD PRESSURE: 100 MMHG | BODY MASS INDEX: 27.49 KG/M2

## 2019-02-01 PROCEDURE — G0101: CPT

## 2019-02-01 PROCEDURE — 99213 OFFICE O/P EST LOW 20 MIN: CPT | Mod: 25

## 2019-02-01 PROCEDURE — 82270 OCCULT BLOOD FECES: CPT

## 2019-02-01 PROCEDURE — ZZZZZ: CPT

## 2019-02-01 NOTE — PHYSICAL EXAM
[Awake] : awake [Alert] : alert [Acute Distress] : no acute distress [LAD] : no lymphadenopathy [Thyroid Nodule] : no thyroid nodule [Goiter] : no goiter [Mass] : no breast mass [Nipple Discharge] : no nipple discharge [Axillary LAD] : no axillary lymphadenopathy [Soft] : soft [Tender] : non tender [Distended] : not distended [H/Smegaly] : no hepatosplenomegaly [Oriented x3] : oriented to person, place, and time [Depressed Mood] : not depressed [Flat Affect] : affect not flat [Labia Majora] : labia major [Labia Minora] : labia minora [Normal] : clitoris [Atrophy] : atrophy [No Bleeding] : there was no active vaginal bleeding [Uterine Adnexae] : were not tender and not enlarged [No Tenderness] : no rectal tenderness [Occult Blood] : occult blood test from digital rectal exam was negative [Nl Sphincter Tone] : normal sphincter tone

## 2019-02-01 NOTE — HISTORY OF PRESENT ILLNESS
[1 Year Ago] : 1 year ago [Good] : being in good health [Healthy Diet] : a healthy diet [Regular Exercise] : not exercising regularly [Weight Concerns] : weight concens [Last Mammogram ___] : Last Mammogram was [unfilled] [Last Colonoscopy ___] : Last colonoscopy [unfilled] [Last Pap ___] : Last cervical pap smear was [unfilled] [Postmenopausal] : is postmenopausal

## 2019-02-04 LAB — HPV HIGH+LOW RISK DNA PNL CVX: NOT DETECTED

## 2019-02-07 LAB — CYTOLOGY CVX/VAG DOC THIN PREP: NORMAL

## 2019-03-13 ENCOUNTER — EMERGENCY (EMERGENCY)
Facility: HOSPITAL | Age: 69
LOS: 0 days | Discharge: HOME | End: 2019-03-13
Attending: EMERGENCY MEDICINE | Admitting: EMERGENCY MEDICINE
Payer: COMMERCIAL

## 2019-03-13 VITALS
RESPIRATION RATE: 17 BRPM | TEMPERATURE: 98 F | HEART RATE: 86 BPM | HEIGHT: 65 IN | OXYGEN SATURATION: 95 % | DIASTOLIC BLOOD PRESSURE: 60 MMHG | WEIGHT: 164.91 LBS | SYSTOLIC BLOOD PRESSURE: 117 MMHG

## 2019-03-13 DIAGNOSIS — Z88.1 ALLERGY STATUS TO OTHER ANTIBIOTIC AGENTS STATUS: ICD-10-CM

## 2019-03-13 DIAGNOSIS — F41.8 OTHER SPECIFIED ANXIETY DISORDERS: ICD-10-CM

## 2019-03-13 DIAGNOSIS — Z96.643 PRESENCE OF ARTIFICIAL HIP JOINT, BILATERAL: ICD-10-CM

## 2019-03-13 DIAGNOSIS — Z95.0 PRESENCE OF CARDIAC PACEMAKER: ICD-10-CM

## 2019-03-13 DIAGNOSIS — Z87.820 PERSONAL HISTORY OF TRAUMATIC BRAIN INJURY: ICD-10-CM

## 2019-03-13 DIAGNOSIS — Z98.890 OTHER SPECIFIED POSTPROCEDURAL STATES: Chronic | ICD-10-CM

## 2019-03-13 DIAGNOSIS — M54.9 DORSALGIA, UNSPECIFIED: ICD-10-CM
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
## 2019-03-13 DIAGNOSIS — I10 ESSENTIAL (PRIMARY) HYPERTENSION: ICD-10-CM

## 2019-03-13 DIAGNOSIS — Z96.7 PRESENCE OF OTHER BONE AND TENDON IMPLANTS: Chronic | ICD-10-CM

## 2019-03-13 PROCEDURE — 99053 MED SERV 10PM-8AM 24 HR FAC: CPT

## 2019-03-13 PROCEDURE — 99283 EMERGENCY DEPT VISIT LOW MDM: CPT | Mod: 25

## 2019-03-13 RX ORDER — IBUPROFEN 200 MG
600 TABLET ORAL ONCE
Qty: 0 | Refills: 0 | Status: COMPLETED | OUTPATIENT
Start: 2019-03-13 | End: 2019-03-13

## 2019-03-13 RX ORDER — CYCLOBENZAPRINE HYDROCHLORIDE 10 MG/1
10 TABLET, FILM COATED ORAL ONCE
Qty: 0 | Refills: 0 | Status: COMPLETED | OUTPATIENT
Start: 2019-03-13 | End: 2019-03-13

## 2019-03-13 RX ORDER — IBUPROFEN 200 MG
1 TABLET ORAL
Qty: 30 | Refills: 0 | OUTPATIENT
Start: 2019-03-13

## 2019-03-13 RX ORDER — CYCLOBENZAPRINE HYDROCHLORIDE 10 MG/1
1 TABLET, FILM COATED ORAL
Qty: 15 | Refills: 0 | OUTPATIENT
Start: 2019-03-13

## 2019-03-13 RX ADMIN — Medication 600 MILLIGRAM(S): at 08:31

## 2019-03-13 RX ADMIN — CYCLOBENZAPRINE HYDROCHLORIDE 10 MILLIGRAM(S): 10 TABLET, FILM COATED ORAL at 08:31

## 2019-03-13 NOTE — ED ADULT NURSE NOTE - NSIMPLEMENTINTERV_GEN_ALL_ED
Implemented All Universal Safety Interventions:  [place] to call system. Call bell, personal items and telephone within reach. Instruct patient to call for assistance. Room bathroom lighting operational. Non-slip footwear when patient is off stretcher. Physically safe environment: no spills, clutter or unnecessary equipment. Stretcher in lowest position, wheels locked, appropriate side rails in place.
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
## 2019-03-13 NOTE — ED ADULT NURSE NOTE - CHPI ED NUR SYMPTOMS NEG
no laceration/no crying/no decreased eating/drinking/no fussiness/no neck tenderness/no headache/no disorientation/no dizziness/no difficulty bearing weight/no loss of consciousness/no sleeping issues/no acting out behaviors/no bruising
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
## 2019-03-13 NOTE — ED PROVIDER NOTE - MUSCULOSKELETAL, MLM
Spine appears normal, range of motion is not limited, no muscle or joint tenderness No spinal or paraspinal ttp. C-collar cleared via Nexus criteria.
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
## 2019-03-29 ENCOUNTER — APPOINTMENT (OUTPATIENT)
Dept: UROLOGY | Facility: CLINIC | Age: 69
End: 2019-03-29

## 2019-04-03 ENCOUNTER — APPOINTMENT (OUTPATIENT)
Dept: UROLOGY | Facility: CLINIC | Age: 69
End: 2019-04-03

## 2019-04-10 NOTE — ED ADULT TRIAGE NOTE - DIRECT TO ROOM CARE INITIATED:
"Chief Complaint   Patient presents with     Well Child C&TC     8 years check up.      Medication Reconciliation     completed.        BP 94/60   Pulse 89   Temp 98.7  F (37.1  C) (Oral)   Resp 24   Ht 1.075 m (3' 6.32\")   Wt 17.1 kg (37 lb 12.8 oz)   SpO2 99%   BMI 14.84 kg/m        Due to patient being non-English speaking/uses sign language, an  was used for this visit. Only for face-to-face interpretation by an external agency, date and length of interpretation can be found on the scanned worksheet.     name: Liliana Irene  Agency: [employer]  Language: CHOBOLABS   Telephone number: [phone]  Type of interpretation: Group, spoken; number of participants: 2      Well child hearing and vision screening        HEARING FREQUENCY:    For conditioning purpose only  Right ear: 40db at 1000Hz: present    Right Ear:    20db at 1000Hz: present  20db at 2000Hz: present  20db at 4000Hz: present  20db at 6000Hz (11 years and older): not examined    Left Ear:    20db at 6000Hz (11 years and older): not examined  20db at 4000Hz: present  20db at 2000Hz: present  20db at 1000Hz: present    Right Ear:    25db at 500Hz: present    Left Ear:    25db at 500Hz: present    Hearing Screen:  Pass-- [place] all tones    VISION:  Far vision: Right eye 20/25, Left eye 20/25, with no corrective lens  Plus lens (5 years and older who pass distance screening and do not have corrective lens):  Pass - blurred vision      Performed by:Joseph Barraza CMA    " 21-Feb-2017 09:32

## 2019-04-19 ENCOUNTER — APPOINTMENT (OUTPATIENT)
Age: 69
End: 2019-04-19
Payer: MEDICARE

## 2019-04-19 VITALS
HEART RATE: 93 BPM | DIASTOLIC BLOOD PRESSURE: 67 MMHG | TEMPERATURE: 98.9 F | HEIGHT: 65 IN | SYSTOLIC BLOOD PRESSURE: 130 MMHG | BODY MASS INDEX: 26.66 KG/M2 | RESPIRATION RATE: 16 BRPM | OXYGEN SATURATION: 96 % | WEIGHT: 160 LBS

## 2019-04-19 PROCEDURE — 99213 OFFICE O/P EST LOW 20 MIN: CPT | Mod: 25

## 2019-04-19 PROCEDURE — 51798 US URINE CAPACITY MEASURE: CPT | Mod: 59

## 2019-04-19 PROCEDURE — 51741 ELECTRO-UROFLOWMETRY FIRST: CPT

## 2019-04-19 NOTE — PHYSICAL EXAM
[General Appearance - Well Nourished] : well nourished [General Appearance - Well Developed] : well developed [Well Groomed] : well groomed [Normal Appearance] : normal appearance [General Appearance - In No Acute Distress] : no acute distress [Abdomen Tenderness] : non-tender [Abdomen Soft] : soft [Costovertebral Angle Tenderness] : no ~M costovertebral angle tenderness [Edema] : no peripheral edema [Respiration, Rhythm And Depth] : normal respiratory rhythm and effort [Exaggerated Use Of Accessory Muscles For Inspiration] : no accessory muscle use [] : no respiratory distress [No Focal Deficits] : no focal deficits

## 2019-04-19 NOTE — HISTORY OF PRESENT ILLNESS
[FreeTextEntry1] : This patient has been on pain a call for some period of time.   She is here for a goal and to have her bladder check. Urinary flow study does show a low flow curve and the residual urine is 127 cc. This is consistent with prior studies.

## 2019-04-20 LAB
APPEARANCE: CLEAR
BACTERIA: NEGATIVE
BILIRUBIN URINE: NEGATIVE
BLOOD URINE: NEGATIVE
COLOR: NORMAL
GLUCOSE QUALITATIVE U: NEGATIVE
HYALINE CASTS: 0 /LPF
KETONES URINE: NEGATIVE
LEUKOCYTE ESTERASE URINE: NEGATIVE
MICROSCOPIC-UA: NORMAL
NITRITE URINE: NEGATIVE
PH URINE: 7
PROTEIN URINE: NEGATIVE
RED BLOOD CELLS URINE: 2 /HPF
SPECIFIC GRAVITY URINE: 1.01
SQUAMOUS EPITHELIAL CELLS: 0 /HPF
UROBILINOGEN URINE: NORMAL
WHITE BLOOD CELLS URINE: 0 /HPF

## 2019-04-21 LAB — BACTERIA UR CULT: NORMAL

## 2019-04-30 NOTE — CONSULT NOTE ADULT - CONSULT REASON
Slurred speech Slurred speech DVT/PE prophylaxis, risk stratification and Anticoagulation Management Slurred speech Slurred speech Slurred speech

## 2019-07-11 ENCOUNTER — TRANSCRIPTION ENCOUNTER (OUTPATIENT)
Age: 69
End: 2019-07-11

## 2019-07-19 ENCOUNTER — TRANSCRIPTION ENCOUNTER (OUTPATIENT)
Age: 69
End: 2019-07-19

## 2019-07-25 ENCOUNTER — APPOINTMENT (OUTPATIENT)
Dept: OBGYN | Facility: CLINIC | Age: 69
End: 2019-07-25

## 2019-08-22 ENCOUNTER — APPOINTMENT (OUTPATIENT)
Dept: OBGYN | Facility: CLINIC | Age: 69
End: 2019-08-22
Payer: MEDICARE

## 2019-08-22 VITALS
DIASTOLIC BLOOD PRESSURE: 80 MMHG | BODY MASS INDEX: 26.23 KG/M2 | WEIGHT: 157.41 LBS | HEIGHT: 65 IN | SYSTOLIC BLOOD PRESSURE: 140 MMHG | TEMPERATURE: 98.3 F

## 2019-08-22 DIAGNOSIS — Z82.69 FAMILY HISTORY OF OTHER DISEASES OF THE MUSCULOSKELETAL SYSTEM AND CONNECTIVE TISSUE: ICD-10-CM

## 2019-08-22 PROCEDURE — 99214 OFFICE O/P EST MOD 30 MIN: CPT

## 2019-08-22 PROCEDURE — 99212 OFFICE O/P EST SF 10 MIN: CPT

## 2019-10-16 ENCOUNTER — APPOINTMENT (OUTPATIENT)
Dept: ORTHOPEDIC SURGERY | Facility: CLINIC | Age: 69
End: 2019-10-16

## 2019-10-25 ENCOUNTER — APPOINTMENT (OUTPATIENT)
Dept: UROLOGY | Facility: CLINIC | Age: 69
End: 2019-10-25
Payer: MEDICARE

## 2019-10-25 VITALS
SYSTOLIC BLOOD PRESSURE: 109 MMHG | BODY MASS INDEX: 24.83 KG/M2 | HEIGHT: 65 IN | HEART RATE: 99 BPM | OXYGEN SATURATION: 94 % | DIASTOLIC BLOOD PRESSURE: 60 MMHG | WEIGHT: 149 LBS

## 2019-10-25 PROCEDURE — 51798 US URINE CAPACITY MEASURE: CPT

## 2019-10-25 PROCEDURE — 99213 OFFICE O/P EST LOW 20 MIN: CPT | Mod: 25

## 2019-10-25 NOTE — HISTORY OF PRESENT ILLNESS
[FreeTextEntry1] : This patient is here today for a checkup. She has a history of an atonic bladder in the past.\par Her urinary residual today was only 50 cc.

## 2019-10-25 NOTE — PHYSICAL EXAM
[General Appearance - Well Developed] : well developed [General Appearance - Well Nourished] : well nourished [Normal Appearance] : normal appearance [Well Groomed] : well groomed [General Appearance - In No Acute Distress] : no acute distress [Abdomen Soft] : soft [Abdomen Tenderness] : non-tender [Costovertebral Angle Tenderness] : no ~M costovertebral angle tenderness [Edema] : no peripheral edema [] : no respiratory distress [Respiration, Rhythm And Depth] : normal respiratory rhythm and effort [Exaggerated Use Of Accessory Muscles For Inspiration] : no accessory muscle use [No Focal Deficits] : no focal deficits

## 2019-10-28 ENCOUNTER — APPOINTMENT (OUTPATIENT)
Dept: ORTHOPEDIC SURGERY | Facility: CLINIC | Age: 69
End: 2019-10-28
Payer: MEDICARE

## 2019-10-28 VITALS
BODY MASS INDEX: 24.83 KG/M2 | DIASTOLIC BLOOD PRESSURE: 64 MMHG | HEART RATE: 87 BPM | HEIGHT: 65 IN | WEIGHT: 149 LBS | SYSTOLIC BLOOD PRESSURE: 111 MMHG

## 2019-10-28 DIAGNOSIS — M79.604 PAIN IN RIGHT LEG: ICD-10-CM

## 2019-10-28 DIAGNOSIS — G89.29 PAIN IN RIGHT ANKLE AND JOINTS OF RIGHT FOOT: ICD-10-CM

## 2019-10-28 DIAGNOSIS — M25.571 PAIN IN RIGHT ANKLE AND JOINTS OF RIGHT FOOT: ICD-10-CM

## 2019-10-28 PROCEDURE — 99204 OFFICE O/P NEW MOD 45 MIN: CPT

## 2019-10-28 PROCEDURE — 73610 X-RAY EXAM OF ANKLE: CPT | Mod: RT

## 2019-10-28 PROCEDURE — 73630 X-RAY EXAM OF FOOT: CPT | Mod: RT

## 2019-10-28 PROCEDURE — 73590 X-RAY EXAM OF LOWER LEG: CPT | Mod: RT

## 2019-10-28 PROCEDURE — 99203 OFFICE O/P NEW LOW 30 MIN: CPT

## 2019-10-28 NOTE — ADDENDUM
[FreeTextEntry1] : I, Adam Smita, acted solely as a scribe for Dr. Seymour Loera on this date 10/28/2019 .\par All medical record entries made by the Scribe were at my, Dr. Seymour Loera, direction and personally dictated by me on 10/28/2019 . I have reviewed the chart and agree that the record accurately reflects my personal performance of the history, physical exam, assessment and plan. I have also personally directed, reviewed, and agreed with the chart.\par \par \par

## 2019-10-28 NOTE — PHYSICAL EXAM
[de-identified] : General: Alert and oriented x3. In no acute distress. Pleasant in nature with a normal affect. No apparent respiratory distress.\par \par R Foot Exam\par Skin: Clean, dry, intact\par Inspection: No obvious malalignment, no masses, no swelling, no effusion\par Pulses: 2+ DP/PT pulses\par ROM: FOOT Full ROM of digits, ANKLE 10 degrees of dorsiflexion, 40 degrees of plantarflexion, 10 degrees of subtalar motion.\par Painful ROM: None\par Tenderness: No tenderness over the medial malleolus, No tenderness over the lateral malleolus, no CFL/ATFL/PTFL pain, no deltoid ligament pain. No heel pain. No Achilles tenderness. No 5th metatarsal pain. No pain to the LisFranc joint. No ttp over the posterior tibial tendon.\par Stability: Negative anterior/posterior drawer.\par Strength: 5/5 ADD/ABD/TA/GS/EHL/FHL/EDL\par Neuro: Sensation in tact to light touch throughout\par Additional tests: Negative Mortons test, negative tarsal tunnel tinels, negative single heel rise.  [de-identified] : 2V of the right tib/fib were ordered obtained and reviewed by me today, 10/28/2019 , revealed: hardware noted, healing noted to tibia\par \par 3V of the right foot and ankle were ordered obtained and reviewed by me today, 10/28/2019 , revealed: no acute fx\par \par \par  \par \par \par

## 2019-10-28 NOTE — CONSULT LETTER
[Consult Letter:] : I had the pleasure of evaluating your patient, [unfilled]. [Please see my note below.] : Please see my note below. [Consult Closing:] : Thank you very much for allowing me to participate in the care of this patient.  If you have any questions, please do not hesitate to contact me. [Sincerely,] : Sincerely, [FreeTextEntry3] : Seymour Loera, DO\par Foot and Ankle Surgery\par

## 2019-10-28 NOTE — DISCUSSION/SUMMARY
[de-identified] : Today I had a lengthy discussion with the patient regarding their right foot pain.I have addressed all the patient's concerns surrounding the pathology of their condition. I recommend that the patient continue physical therapy. I would like to see the patient back in the office PRN to reassess their condition. The patient understood and verbally agreed to the treatment plan. All of their questions were answered and they were satisfied with the visit. The patient should call the office if they have any questions or experience worsening symptoms.\par \par \par

## 2019-10-28 NOTE — HISTORY OF PRESENT ILLNESS
[FreeTextEntry1] : 68 year old female presenting with right foot pain pain. The patient’s pain is noted to be a 4/10. The patient's pain began on 11/1/18. The patient describes their pain as intermittent, sharp, and cramping. The pain is made worse by walking, standing, sitting, and lying down. The patient is currently in physical therapy. The patient had a previous surgery in 2011 and had a giselle placed. The patient presents ambulating with a cane. She is currently taking no pain medication. No other complaints at this time.\par \par \par

## 2019-10-30 ENCOUNTER — LABORATORY RESULT (OUTPATIENT)
Age: 69
End: 2019-10-30

## 2019-11-13 ENCOUNTER — APPOINTMENT (OUTPATIENT)
Dept: NEUROSURGERY | Facility: CLINIC | Age: 69
End: 2019-11-13
Payer: MEDICARE

## 2019-11-13 VITALS
DIASTOLIC BLOOD PRESSURE: 54 MMHG | BODY MASS INDEX: 26 KG/M2 | OXYGEN SATURATION: 97 % | HEIGHT: 65 IN | TEMPERATURE: 99 F | WEIGHT: 156.06 LBS | HEART RATE: 95 BPM | SYSTOLIC BLOOD PRESSURE: 113 MMHG

## 2019-11-13 DIAGNOSIS — M54.2 CERVICALGIA: ICD-10-CM

## 2019-11-13 DIAGNOSIS — M79.604 PAIN IN RIGHT LEG: ICD-10-CM

## 2019-11-13 DIAGNOSIS — Z87.39 PERSONAL HISTORY OF OTHER DISEASES OF THE MUSCULOSKELETAL SYSTEM AND CONNECTIVE TISSUE: ICD-10-CM

## 2019-11-13 DIAGNOSIS — Z86.59 PERSONAL HISTORY OF OTHER MENTAL AND BEHAVIORAL DISORDERS: ICD-10-CM

## 2019-11-13 DIAGNOSIS — Z87.820 PERSONAL HISTORY OF TRAUMATIC BRAIN INJURY: ICD-10-CM

## 2019-11-13 DIAGNOSIS — Z86.69 PERSONAL HISTORY OF OTHER DISEASES OF THE NERVOUS SYSTEM AND SENSE ORGANS: ICD-10-CM

## 2019-11-13 DIAGNOSIS — Z87.898 PERSONAL HISTORY OF OTHER SPECIFIED CONDITIONS: ICD-10-CM

## 2019-11-13 PROCEDURE — 99203 OFFICE O/P NEW LOW 30 MIN: CPT

## 2019-11-13 RX ORDER — PAROXETINE HYDROCHLORIDE 40 MG/1
TABLET, FILM COATED ORAL
Refills: 0 | Status: ACTIVE | COMMUNITY

## 2019-11-13 RX ORDER — PAROXETINE HYDROCHLORIDE 25 MG/1
25 TABLET, FILM COATED, EXTENDED RELEASE ORAL
Refills: 0 | Status: DISCONTINUED | COMMUNITY
End: 2019-11-13

## 2019-11-13 RX ORDER — SENNOSIDES 8.6 MG TABLETS 8.6 MG/1
TABLET ORAL
Refills: 0 | Status: ACTIVE | COMMUNITY

## 2019-11-13 RX ORDER — MULTIVIT-MIN/FA/LYCOPEN/LUTEIN .4-300-25
TABLET ORAL
Refills: 0 | Status: ACTIVE | COMMUNITY

## 2019-11-13 RX ORDER — LORATADINE 10 MG
17 TABLET,DISINTEGRATING ORAL
Refills: 0 | Status: ACTIVE | COMMUNITY

## 2019-11-13 RX ORDER — CYCLOSPORINE 0.5 MG/ML
0.05 EMULSION OPHTHALMIC
Refills: 0 | Status: ACTIVE | COMMUNITY

## 2019-11-13 NOTE — REVIEW OF SYSTEMS
[Feeling Tired] : feeling tired [Depression] : depression [Anxiety] : anxiety [Joint Pain] : joint pain [Joint Swelling] : joint swelling [Negative] : Heme/Lymph [de-identified] : Balance [FreeTextEntry3] : Vision Changes [FreeTextEntry5] : Syncope [FreeTextEntry9] : Muscle Pain
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
## 2019-11-22 ENCOUNTER — RX RENEWAL (OUTPATIENT)
Age: 69
End: 2019-11-22

## 2019-11-30 ENCOUNTER — TRANSCRIPTION ENCOUNTER (OUTPATIENT)
Age: 69
End: 2019-11-30

## 2019-12-30 ENCOUNTER — APPOINTMENT (OUTPATIENT)
Dept: NEUROSURGERY | Facility: CLINIC | Age: 69
End: 2019-12-30
Payer: MEDICARE

## 2019-12-30 VITALS
BODY MASS INDEX: 26.06 KG/M2 | SYSTOLIC BLOOD PRESSURE: 142 MMHG | OXYGEN SATURATION: 95 % | TEMPERATURE: 97.8 F | HEART RATE: 96 BPM | DIASTOLIC BLOOD PRESSURE: 71 MMHG | HEIGHT: 65 IN | WEIGHT: 156.44 LBS

## 2019-12-30 DIAGNOSIS — R20.2 PARESTHESIA OF SKIN: ICD-10-CM

## 2019-12-30 DIAGNOSIS — M54.5 LOW BACK PAIN: ICD-10-CM

## 2019-12-30 DIAGNOSIS — M54.10 RADICULOPATHY, SITE UNSPECIFIED: ICD-10-CM

## 2019-12-30 PROCEDURE — 99213 OFFICE O/P EST LOW 20 MIN: CPT

## 2019-12-30 RX ORDER — PREGABALIN 25 MG/1
25 CAPSULE ORAL
Refills: 0 | Status: DISCONTINUED | COMMUNITY
End: 2019-12-30

## 2020-01-12 ENCOUNTER — FORM ENCOUNTER (OUTPATIENT)
Age: 70
End: 2020-01-12

## 2020-01-13 ENCOUNTER — OUTPATIENT (OUTPATIENT)
Dept: OUTPATIENT SERVICES | Facility: HOSPITAL | Age: 70
LOS: 1 days | End: 2020-01-13
Payer: MEDICARE

## 2020-01-13 ENCOUNTER — APPOINTMENT (OUTPATIENT)
Dept: RADIOLOGY | Facility: CLINIC | Age: 70
End: 2020-01-13
Payer: MEDICARE

## 2020-01-13 DIAGNOSIS — Z00.8 ENCOUNTER FOR OTHER GENERAL EXAMINATION: ICD-10-CM

## 2020-01-13 DIAGNOSIS — Z98.890 OTHER SPECIFIED POSTPROCEDURAL STATES: Chronic | ICD-10-CM

## 2020-01-13 DIAGNOSIS — Z96.7 PRESENCE OF OTHER BONE AND TENDON IMPLANTS: Chronic | ICD-10-CM

## 2020-01-13 PROCEDURE — 73502 X-RAY EXAM HIP UNI 2-3 VIEWS: CPT | Mod: 26,RT

## 2020-01-13 PROCEDURE — 73502 X-RAY EXAM HIP UNI 2-3 VIEWS: CPT

## 2020-01-28 PROBLEM — R20.2 PARESTHESIA OF BOTH FEET: Status: ACTIVE | Noted: 2019-11-13

## 2020-01-28 PROBLEM — M54.5 LUMBAGO: Status: ACTIVE | Noted: 2019-11-13

## 2020-01-28 PROBLEM — M54.10 RADICULOPATHY OF LEG: Status: ACTIVE | Noted: 2019-10-28

## 2020-01-28 NOTE — CONSULT LETTER
[Dear  ___] : Dear  [unfilled], [Sincerely,] : Sincerely, [FreeTextEntry2] : Shankar Parrish MD\par [address] \[place] [address] \par  [FreeTextEntry1] : I have seen your patient Alexandria Bhat in my office to provide an additional opinion regarding difficulty with balance, falls, and lower back pain with radiating symptoms involving both legs right worse than left. It is 6 weeks since we saw the patient previously. Patient has now undergone CT imaging of the brain cervical spine, lumbar spine, and dynamic x-rays of the lumbar spine. There is no specific change in the patient's symptom profile since the last office visit. The patient does report that physical therapy and chiropractic intervention has been giving some relief.\par \par I have reviewed all imaging studies with the patient in detail. A CT images were performed because of the patient's implanted cardiac device which precludes MRI. The CT scan of the head demonstrates some thickening of the anterior skull consistent with a hyperostosis but there is no concerns with respect to the temporal bones, the cerebellum, or brain stem that would indicate a cranial cause for instability. CT scan of the cervical spine does not identify any central stenosis. There are some minor degenerative spondylotic changes with no significant foraminal compromise. 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 There is a vacuum disc phenomenon at L5-S1 and grade 1 spondylolisthesis. X-rays in the flexion and extension standing views do not show any dynamic instability.\par \par There are no changes in the patient's neurologic examination since her previous assessment.\par \par I have had an extended discussion with the patient regarding these findings and possible treatment options. The patient has a complicated past history of Lyme disease, vitamin deficiency, and previous EMG nerve conduction studies indicating peripheral polyneuropathy without radiculopathy. It is possible that the patient would benefit from decompression of this tight stenosis seen at L4-5 but it is not clear currently if performing surgery would definitively improve the symptoms that are bothering her the most. The patient certainly has new and progressive symptoms on the right hand side but were not present at the time of the previous EMG study. I have therefore asked the patient to undergo a repeat an EMG study to see if there is evidence of progressive peripheral neuropathy versus any new radicular pattern. This would provide important information with respect to understanding if there is a role for surgical intervention.\par \par Thank you for kindly including us in the evaluation and treatment of your patient. Please do not hesitate to contact us should any questions regarding this evaluation are proposed additional physiologic testing to assist with decision making about spinal decompression or fusion surgery. [FreeTextEntry3] : Sanjeev Whiting MD, PhD, FRCPSC \par Attending Neurosurgeon \par  of Neurosurgery \par [facility] \par [address], 2nd floor \[place] [address] \par Office: [phone] \par Fax: [phone]\par \par

## 2020-03-06 ENCOUNTER — APPOINTMENT (OUTPATIENT)
Dept: OBGYN | Facility: CLINIC | Age: 70
End: 2020-03-06
Payer: MEDICARE

## 2020-03-06 VITALS
SYSTOLIC BLOOD PRESSURE: 100 MMHG | WEIGHT: 158 LBS | DIASTOLIC BLOOD PRESSURE: 60 MMHG | HEIGHT: 65 IN | BODY MASS INDEX: 26.33 KG/M2

## 2020-03-06 DIAGNOSIS — M85.80 OTHER SPECIFIED DISORDERS OF BONE DENSITY AND STRUCTURE, UNSPECIFIED SITE: ICD-10-CM

## 2020-03-06 PROCEDURE — G0101: CPT

## 2020-03-06 RX ORDER — FLUDROCORTISONE ACETATE 0.1 MG/1
0.1 TABLET ORAL
Refills: 0 | Status: COMPLETED | COMMUNITY

## 2020-03-06 NOTE — PHYSICAL EXAM
[Awake] : awake [Alert] : alert [Acute Distress] : no acute distress [Mass] : no breast mass [Nipple Discharge] : no nipple discharge [Axillary LAD] : no axillary lymphadenopathy [Soft] : soft [Tender] : non tender [Oriented x3] : oriented to person, place, and time [Normal] : uterus [No Bleeding] : there was no active vaginal bleeding [Uterine Adnexae] : were not tender and not enlarged [FreeTextEntry9] : having cologaurd next month

## 2020-03-06 NOTE — HISTORY OF PRESENT ILLNESS
[1 Year Ago] : 1 year ago [Good] : being in good health [Healthy Diet] : a healthy diet [Regular Exercise] : not exercising regularly [Weight Concerns] : weight concens [Last Mammogram ___] : Last Mammogram was [unfilled] [Last Bone Density ___] : Last bone density studies [unfilled] [Last Colonoscopy ___] : Last colonoscopy [unfilled] [Postmenopausal] : is postmenopausal

## 2020-04-17 NOTE — PROGRESS NOTE ADULT - ASSESSMENT
RN addressing issue in separate encounter. Pt sending multiple E-advice encounters. RN noting all messages into one encounter to avoid confusion.   A/P: 66F s/p BL SARAH POD 3  Analgesia  DVT ppx  WBAT  PT/OT  DC planning  DC [place] Today

## 2020-05-21 ENCOUNTER — APPOINTMENT (OUTPATIENT)
Dept: UROLOGY | Facility: CLINIC | Age: 70
End: 2020-05-21
Payer: MEDICARE

## 2020-05-21 VITALS — TEMPERATURE: 98 F

## 2020-05-21 PROCEDURE — 99213 OFFICE O/P EST LOW 20 MIN: CPT

## 2020-05-21 NOTE — PHYSICAL EXAM
[General Appearance - Well Developed] : well developed [General Appearance - Well Nourished] : well nourished [Normal Appearance] : normal appearance [Well Groomed] : well groomed [Abdomen Soft] : soft [General Appearance - In No Acute Distress] : no acute distress [Abdomen Tenderness] : non-tender [Costovertebral Angle Tenderness] : no ~M costovertebral angle tenderness [Edema] : no peripheral edema [] : no respiratory distress [Respiration, Rhythm And Depth] : normal respiratory rhythm and effort [Exaggerated Use Of Accessory Muscles For Inspiration] : no accessory muscle use [No Focal Deficits] : no focal deficits

## 2020-05-21 NOTE — END OF VISIT
[FreeTextEntry3] : Urine was sent and her medication is refilled.  She will follow-up in 6 months or as needed

## 2020-05-21 NOTE — HISTORY OF PRESENT ILLNESS
[FreeTextEntry1] : This patient has a known atonic bladder and is here for a checkup.  She needs a refill on her bethanechol as well.  She states that she has been doing well and has no actual complaints other than needing a refill.

## 2020-06-18 NOTE — REASON FOR VISIT
--------------  ADMISSION REVIEW     Payor: [address] #:  [id number]  Authorization Number: [id number]    Admit date: 6/17/20  Admit time: 1518       Admitting Physician: Molly Beckman MD  Attending Physician:  Ruth Jack MD  Pr systems reviewed and negative except as noted above.     Physical Exam     ED Triage Vitals [06/17/20 0740]   BP (!) 79/45   Pulse (!) 159   Resp (!) 28   Temp 99.2 °F (37.3 °C)   Temp src Rectal   SpO2 92 %   O2 Device None (Room air)     Current:BP (!) 23 5.4 (*)     Calculated Osmolality 299 (*)     GFR, Non- 38 (*)     GFR, -American 44 (*)     All other components within normal limits   PROTHROMBIN TIME (PT) - Abnormal; Notable for the following components:    PT 14.7 (*)     All o within normal limits   POCT GLUCOSE - Abnormal; Notable for the following components:    POC Glucose  124 (*)     All other components within normal limits   CBC W/ DIFFERENTIAL - Abnormal; Notable for the following components:    WBC 12.2 (*)     RDW-SD 4 placed  Location: left tibial plateau  IO flushed easily and withdrew   There were no complications. MDM    On arrival patient was confused, listless, diaphoretic and in severe acute distress. She was also hypotensive.   Rales were noted on exam.  Beds OR. She was too unstable to obtain a CT. Imaging:   Xr Chest Ap Portable  (cpt=71045)  Result Date: 6/17/2020  CONCLUSION:  1. Normal heart and lungs. 2. ET tube in good position.       Rhythm Strip: Rate 155 sinus  Total Critical Care Time: 120 minutes administered with return of spontaneous circulation. Currently on epinephrine GTT. Intubated, sedated. Mother unaware of any recent illness, fevers, history of cardiac or pulmonary disease.   Unable to obtain 12 point review of systems secondary patient' 06/17/2020    CREATSERUM 1.86 (H) 06/17/2020    BUN 10 06/17/2020     06/17/2020    K 2.8 (LL) 06/17/2020     06/17/2020    CO2 17.0 (L) 06/17/2020     (H) 06/17/2020    CA 8.6 06/17/2020    ALB 3.2 (L) 06/17/2020    ALKPHO 42 (L) 06/17/ operating room. Underwent emergent thrombectomy and insertion of right ventricular assist device. Found to have massive pulmonary embolism. Requiring multiple pressor support with norepinephrine, epinephrine, vasopressin.   Requiring inotropic support wi we have another option.   Mallory Page  6/17/2020     Cardiology Consult Note  Interventional, Cardiology  25 yo female presented with SOB to the emergency room then had a yobani->PEA arrest.  Bedside echo significant for severe RV enlargement, compressing the A moderate amount of clot was removed from the pulmonary arteries, though no large saddle embolus was identified.   The patient following thrombectomy was unable to be weaned from bypass and with the assistance of Cardiology an Impella RP was placed and th opened in inverted T fashion and suspended with silk sutures creating a pericardial well. The RV was quite dilated despite full flow and good positioning of the venous cannula.   A vent was inserted into the atrium and advanced into the RV and with this th stainless steel wire. Esmark was stapled to the wound edges and covered with Ioban. The groin was then closed in layers with 2-0, 3-0, and 4-0 Vicryl sutures. Sterile dressings were applied.   Due to the emergent nature of the case counts were not confir DAY:  acetaminophen (OFIRMEV) infusion 1,000 mg     Date Action Dose Route User    6/18/2020 5493 Given 1000 mg Intravenous Cristian Zurita RN    6/17/2020 5008 Given 1000 mg Intravenous Mayr Chiu, RN      Albumin Human (ALBUMINAR) 5 % solution 250 Verify 1.2 mcg/kg/hr × 106.6 kg Intravenous Naty Harmon RN    6/18/2020 0349 Rate/Dose Change 1.2 mcg/kg/hr × 106.6 kg Intravenous Naty Harmon RN    6/18/2020 0300 Rate/Dose Change 0.8 mcg/kg/hr × 106.6 kg Intravenous Naty Harmon RN User    6/18/2020 0900 Rate/Dose Change 10 mcg/kg/min × 106.6 kg Intravenous Jg Haq RN    6/18/2020 0800 Rate/Dose Verify 9 mcg/kg/min × 106.6 kg Intravenous Jg Haq RN    6/18/2020 0600 Rate/Dose Verify 9 mcg/kg/min × 106.6 kg Intra Laura Florentino MD    6/17/2020 1133 Rate/Dose Change 5 mcg/min Intravenous Jarek Vance MD    6/17/2020 1109 Rate/Dose Change 3 mcg/min Intravenous Jarek Vance MD    6/17/2020 1057 New Bag 2 mcg/min Intravenous Jarek Vance MD      EPINEPHrine HCl (ADRENA [Follow-up Visit ___] : a follow-up visit  for [unfilled] 0500 Rate/Dose Verify 7 Units/hr Intravenous Cristian Zurita RN    6/18/2020 0400 Rate/Dose Change 7 Units/hr Intravenous Cristian Zurita RN    6/18/2020 0300 Rate/Dose Change 9 Units/hr Intravenous Cristian Zurita RN    6/18/2020 0200 Rate/Dose Verify mcg/kg/min × 106.6 kg Intravenous Jayna Bansal RN    6/18/2020 0500 Rate/Dose Verify 0.425 mcg/kg/min × 106.6 kg Intravenous Jayna Bansal RN    6/18/2020 0440 New Bag 0.425 mcg/kg/min × 106.6 kg Intravenous Jayna Bansal RN    6/18/2020 9605 Rat Intravenous Lilia Dey MD    6/17/2020 1332 Rate/Dose Change 10 mcg/min Intravenous Lilia Dey MD    6/17/2020 1121 New Bag 5 mcg/min Intravenous Lilia Dey MD      norepinephrine (LEVOPHED) 4 mg/250 ml premix infusion     Date Action Dose Rout 6/17/2020 1017 Given 5 g Intravenous Adonis Valle MD      bacitracin [place]) 50,000 Units in sodium chloride 0.9 % 1,000 mL irrigation (OR to mix)     Date Action Dose Route User    6/17/2020 1034 Given (none) Irrigation Raven Reagan MD    6/17/2020 103 106.6 kg Intravenous Emily Rangel RN    6/18/2020 0400 Rate/Dose Verify 20 mcg/kg/min × 106.6 kg Intravenous Alissa Desouza RN    6/18/2020 0352 New Bag 20 mcg/kg/min × 106.6 kg Intravenous Alissa Desouza RN      Protamine Sulfate injection 6/17/2020 2212 Given 50 mEq (none) Dawson Royer, RN      sodium bicarbonate 8.4 % injection     Date Action Dose Route User    6/17/2020 2225 Given 50 mEq (none) Christa CAUSEY RN      sodium chloride 0.9 % irrigation     Date Action Dose Route 1400 New Bag 0.1 Units/min Intravenous Kay Morin MD            REVIEWER COMMENTS:     FOR REVIEW/APPROVAL OF INPT ICU ADMISSION

## 2020-07-13 LAB — SARS-COV-2 N GENE NPH QL NAA+PROBE: NOT DETECTED

## 2020-07-14 ENCOUNTER — APPOINTMENT (OUTPATIENT)
Dept: NEUROLOGY | Facility: CLINIC | Age: 70
End: 2020-07-14
Payer: MEDICARE

## 2020-07-14 PROCEDURE — 95909 NRV CNDJ TST 5-6 STUDIES: CPT

## 2020-07-14 PROCEDURE — 95886 MUSC TEST DONE W/N TEST COMP: CPT

## 2020-07-14 NOTE — PROCEDURE
[FreeTextEntry1] : EMG/NCV of the lower extremities shows evidence for a right peroneal nerve injury at the knee.

## 2020-07-14 NOTE — PHYSICAL EXAM
[Person] : oriented to person [Comprehension] : comprehension intact [Fluency] : fluency intact [Past History] : adequate knowledge of personal past history [Cranial Nerves Optic (II)] : visual acuity intact bilaterally,  visual fields full to confrontation, pupils equal round and reactive to light [Cranial Nerves Oculomotor (III)] : extraocular motion intact [Cranial Nerves Facial (VII)] : face symmetrical [Cranial Nerves Vestibulocochlear (VIII)] : hearing was intact bilaterally [Cranial Nerves Accessory (XI - Cranial And Spinal)] : head turning and shoulder shrug symmetric [Cranial Nerves Hypoglossal (XII)] : there was no tongue deviation with protrusion [Motor Tone] : muscle tone was normal in all four extremities [Motor Strength] : muscle strength was normal in all four extremities [Motor Handedness Right-Handed] : the patient is right hand dominant [2+] : Patella left 2+ [0] : Ankle jerk left 0 [Plantar Reflex Right Only] : normal on the right [Plantar Reflex Left Only] : normal on the left [FreeTextEntry8] : reduced soft touch dorsum of the right foot.

## 2020-07-14 NOTE — HISTORY OF PRESENT ILLNESS
[FreeTextEntry1] : 69 year old woman c/o pain and weakness of the right leg, has noted the right foot turns inward for the last few months.

## 2020-07-27 ENCOUNTER — APPOINTMENT (OUTPATIENT)
Dept: NEUROSURGERY | Facility: CLINIC | Age: 70
End: 2020-07-27
Payer: MEDICARE

## 2020-07-27 VITALS
OXYGEN SATURATION: 96 % | WEIGHT: 150 LBS | SYSTOLIC BLOOD PRESSURE: 105 MMHG | HEART RATE: 107 BPM | HEIGHT: 65 IN | DIASTOLIC BLOOD PRESSURE: 61 MMHG | BODY MASS INDEX: 24.99 KG/M2 | TEMPERATURE: 97.3 F

## 2020-07-27 DIAGNOSIS — G57.31 LESION OF LATERAL POPLITEAL NERVE, RIGHT LOWER LIMB: ICD-10-CM

## 2020-07-27 DIAGNOSIS — M25.551 PAIN IN RIGHT HIP: ICD-10-CM

## 2020-07-27 DIAGNOSIS — R29.6 REPEATED FALLS: ICD-10-CM

## 2020-07-27 PROCEDURE — 99213 OFFICE O/P EST LOW 20 MIN: CPT
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
## 2020-07-27 NOTE — CONSULT LETTER
[Dear  ___] : Dear  [unfilled], [Courtesy Letter:] : I had the pleasure of seeing your patient, [unfilled], in my office today. [Sincerely,] : Sincerely, [FreeTextEntry2] : Shankar Parrish MD\par [address] [facility]\[place] [address]  [FreeTextEntry1] : This very pleasant 69-year-old woman returns today to review results of her recent EMG nerve conduction study of the lower limbs.  You may recall that this patient has a significant past history of bilateral hip replacements, trauma resulting in a right tibia and fibula fracture requiring surgical repair with giselle placement, and chronic degenerative changes of the lumbar spine.  She presented because of increasing problems with balance multiple falls.  At my previous evaluation it was not clear if the patient's main problem was a peripheral neuropathy or a radiculopathy secondary to extensive degenerative spondylotic changes within the lumbosacral region.  The patient indicates no specific change in her symptom profile.  Of late she has been noticing more pain in the knee, ankle, and hip region on the right-hand side.\par \par I have reviewed with the patient her EMG nerve conduction study results.  There is no evidence of radiculopathy or peripheral neuropathy.  However there is a focal peroneal nerve injury at the knee on the right-hand side.  This finding is in keeping with the patient's known weakness dorsi flexion with inversion of the foot.  The patient has numbness in a distribution also consistent with a peroneal nerve injury.\par \par On examination the patient has scars and evidence of her previous trauma involving the tibia and fibula on the right.  There is no focal tenderness to palpation lateral to the head of the fibula but there is some scar tissue and a slight rounded mass which may be secondary to the surgical intervention or represent new or progressive pathology.  The patient certainly has tenderness to palpation over the right hip, greater trochanter region, which may represent a impact injury from her recent falls or loosening of her hardware.  The patient has persistent weakness of dorsiflexion on the right-hand side as well as sensory changes involving the lateral aspect of the calf and top of foot on the right.  The patient also demonstrates weakness in the quadriceps and hamstrings location when trying to get up from the seated position.  No cause for this was identified on the EMG nerve conduction study.  The patient indicates this is progressive and is not necessarily related to the previous episode of Lyme disease.  This may warrant further investigation also.\par \par At this point time based on the patient's EMG nerve conduction study and further description of her symptoms I do not think that the main underlying cause of her falls is stenosis in the lumbosacral region even though this is significant.  I do not think currently that a laminectomy procedure at L4 and L5 would significantly improve the patient's current walking ability.  However, this may be necessary in the future should other treatment options be ineffective.  It is my recommendation at a starting point that the patient consider wearing a right sided ankle brace to support the relative foot drop and numbness which is contributing to her falls.  I have asked for the patient undergo a CT scan of the right knee in order to determine if there is pathology which may be causing secondary impact on the peroneal nerve at the knee.  A CT of the right hip is also ordered because of the patient's recent falls and focused pain to palpation and range of motion on the right-hand side.  The patient is unable to have MRI scans in these locations because of her implanted pacemaker device.  The patient indicated good understanding of my descriptions and explanations and is willing to move forward with ongoing conservative approach.  She would like to hold off on lumbar surgery as a last resort if other measures are ineffective.\par \par Thank you for very kindly including me in the ongoing evaluation and treatment of your patient.  Please do not hesitate to contact me should you have any questions or concerns regarding this evaluation, the results of the EMG nerve conduction study, or the request for additional diagnostic imaging. [FreeTextEntry3] : Sanjeev Whiting MD, PhD, FRCPSC \par Attending Neurosurgeon \par  of Neurosurgery \par [facility] \par [address] \[place] [address] \par Office: [phone] \par Fax: [phone]\par \par

## 2020-11-20 ENCOUNTER — APPOINTMENT (OUTPATIENT)
Dept: UROLOGY | Facility: CLINIC | Age: 70
End: 2020-11-20
Payer: MEDICARE

## 2020-11-20 ENCOUNTER — TRANSCRIPTION ENCOUNTER (OUTPATIENT)
Age: 70
End: 2020-11-20

## 2020-11-20 PROCEDURE — 99213 OFFICE O/P EST LOW 20 MIN: CPT

## 2020-11-20 NOTE — HISTORY OF PRESENT ILLNESS
[FreeTextEntry1] : This patient presents for her regular checkup.  She has an atonic bladder but seems to been doing well on bethanechol.  She also needs a refill for this

## 2020-11-22 LAB
APPEARANCE: ABNORMAL
BACTERIA UR CULT: NORMAL
BACTERIA: NEGATIVE
BILIRUBIN URINE: NEGATIVE
BLOOD URINE: NEGATIVE
CALCIUM OXALATE CRYSTALS: ABNORMAL
COLOR: YELLOW
GLUCOSE QUALITATIVE U: NEGATIVE
HYALINE CASTS: 0 /LPF
KETONES URINE: NEGATIVE
LEUKOCYTE ESTERASE URINE: NEGATIVE
MICROSCOPIC-UA: NORMAL
NITRITE URINE: NEGATIVE
PH URINE: 6.5
PROTEIN URINE: NORMAL
RED BLOOD CELLS URINE: 2 /HPF
SPECIFIC GRAVITY URINE: 1.02
SQUAMOUS EPITHELIAL CELLS: 0 /HPF
UROBILINOGEN URINE: NORMAL
WHITE BLOOD CELLS URINE: 1 /HPF

## 2020-11-25 LAB — URINE CYTOLOGY: NORMAL

## 2020-12-29 ENCOUNTER — EMERGENCY (EMERGENCY)
Facility: HOSPITAL | Age: 70
LOS: 0 days | Discharge: HOME | End: 2020-12-29
Attending: STUDENT IN AN ORGANIZED HEALTH CARE EDUCATION/TRAINING PROGRAM
Payer: MEDICARE

## 2020-12-29 VITALS — WEIGHT: 139.99 LBS | HEIGHT: 65 IN

## 2020-12-29 VITALS
TEMPERATURE: 98 F | HEART RATE: 88 BPM | RESPIRATION RATE: 18 BRPM | SYSTOLIC BLOOD PRESSURE: 148 MMHG | DIASTOLIC BLOOD PRESSURE: 68 MMHG | OXYGEN SATURATION: 97 %

## 2020-12-29 DIAGNOSIS — Z96.7 PRESENCE OF OTHER BONE AND TENDON IMPLANTS: Chronic | ICD-10-CM

## 2020-12-29 DIAGNOSIS — M48.061 SPINAL STENOSIS, LUMBAR REGION WITHOUT NEUROGENIC CLAUDICATION: ICD-10-CM

## 2020-12-29 DIAGNOSIS — F32.9 MAJOR DEPRESSIVE DISORDER, SINGLE EPISODE, UNSPECIFIED: ICD-10-CM

## 2020-12-29 DIAGNOSIS — M54.9 DORSALGIA, UNSPECIFIED: ICD-10-CM

## 2020-12-29 DIAGNOSIS — Z88.1 ALLERGY STATUS TO OTHER ANTIBIOTIC AGENTS STATUS: ICD-10-CM

## 2020-12-29 DIAGNOSIS — G51.0 BELL'S PALSY: ICD-10-CM

## 2020-12-29 DIAGNOSIS — Z98.890 OTHER SPECIFIED POSTPROCEDURAL STATES: Chronic | ICD-10-CM

## 2020-12-29 DIAGNOSIS — Z79.01 LONG TERM (CURRENT) USE OF ANTICOAGULANTS: ICD-10-CM

## 2020-12-29 DIAGNOSIS — K59.00 CONSTIPATION, UNSPECIFIED: ICD-10-CM

## 2020-12-29 DIAGNOSIS — I10 ESSENTIAL (PRIMARY) HYPERTENSION: ICD-10-CM

## 2020-12-29 DIAGNOSIS — M54.2 CERVICALGIA: ICD-10-CM
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
## 2020-12-29 DIAGNOSIS — G47.30 SLEEP APNEA, UNSPECIFIED: ICD-10-CM

## 2020-12-29 DIAGNOSIS — I49.9 CARDIAC ARRHYTHMIA, UNSPECIFIED: ICD-10-CM
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
## 2020-12-29 DIAGNOSIS — F41.9 ANXIETY DISORDER, UNSPECIFIED: ICD-10-CM

## 2020-12-29 DIAGNOSIS — Z86.19 PERSONAL HISTORY OF OTHER INFECTIOUS AND PARASITIC DISEASES: ICD-10-CM

## 2020-12-29 DIAGNOSIS — R01.1 CARDIAC MURMUR, UNSPECIFIED: ICD-10-CM

## 2020-12-29 DIAGNOSIS — Z87.820 PERSONAL HISTORY OF TRAUMATIC BRAIN INJURY: ICD-10-CM

## 2020-12-29 DIAGNOSIS — M16.0 BILATERAL PRIMARY OSTEOARTHRITIS OF HIP: ICD-10-CM

## 2020-12-29 DIAGNOSIS — Z95.0 PRESENCE OF CARDIAC PACEMAKER: ICD-10-CM

## 2020-12-29 LAB
ANION GAP SERPL CALC-SCNC: 5 MMOL/L — SIGNIFICANT CHANGE UP (ref 5–17)
APPEARANCE UR: CLEAR — SIGNIFICANT CHANGE UP
BASOPHILS # BLD AUTO: 0.04 K/UL — SIGNIFICANT CHANGE UP (ref 0–0.2)
BASOPHILS NFR BLD AUTO: 0.3 % — SIGNIFICANT CHANGE UP (ref 0–2)
BILIRUB UR-MCNC: NEGATIVE — SIGNIFICANT CHANGE UP
BUN SERPL-MCNC: 19 MG/DL — SIGNIFICANT CHANGE UP (ref 7–23)
CALCIUM SERPL-MCNC: 9.3 MG/DL — SIGNIFICANT CHANGE UP (ref 8.5–10.1)
CHLORIDE SERPL-SCNC: 105 MMOL/L — SIGNIFICANT CHANGE UP (ref 96–108)
CO2 SERPL-SCNC: 26 MMOL/L — SIGNIFICANT CHANGE UP (ref 22–31)
COLOR SPEC: YELLOW — SIGNIFICANT CHANGE UP
CREAT SERPL-MCNC: 0.66 MG/DL — SIGNIFICANT CHANGE UP (ref 0.5–1.3)
DIFF PNL FLD: ABNORMAL
EOSINOPHIL # BLD AUTO: 0.07 K/UL — SIGNIFICANT CHANGE UP (ref 0–0.5)
EOSINOPHIL NFR BLD AUTO: 0.6 % — SIGNIFICANT CHANGE UP (ref 0–6)
GLUCOSE SERPL-MCNC: 152 MG/DL — HIGH (ref 70–99)
GLUCOSE UR QL: NEGATIVE MG/DL — SIGNIFICANT CHANGE UP
HCT VFR BLD CALC: 38 % — SIGNIFICANT CHANGE UP (ref 34.5–45)
HGB BLD-MCNC: 12.2 G/DL — SIGNIFICANT CHANGE UP (ref 11.5–15.5)
IMM GRANULOCYTES NFR BLD AUTO: 0.2 % — SIGNIFICANT CHANGE UP (ref 0–1.5)
KETONES UR-MCNC: NEGATIVE — SIGNIFICANT CHANGE UP
LEUKOCYTE ESTERASE UR-ACNC: ABNORMAL
LYMPHOCYTES # BLD AUTO: 1.02 K/UL — SIGNIFICANT CHANGE UP (ref 1–3.3)
LYMPHOCYTES # BLD AUTO: 8.4 % — LOW (ref 13–44)
MCHC RBC-ENTMCNC: 31.4 PG — SIGNIFICANT CHANGE UP (ref 27–34)
MCHC RBC-ENTMCNC: 32.1 GM/DL — SIGNIFICANT CHANGE UP (ref 32–36)
MCV RBC AUTO: 97.9 FL — SIGNIFICANT CHANGE UP (ref 80–100)
MONOCYTES # BLD AUTO: 0.73 K/UL — SIGNIFICANT CHANGE UP (ref 0–0.9)
MONOCYTES NFR BLD AUTO: 6 % — SIGNIFICANT CHANGE UP (ref 2–14)
NEUTROPHILS # BLD AUTO: 10.23 K/UL — HIGH (ref 1.8–7.4)
NEUTROPHILS NFR BLD AUTO: 84.5 % — HIGH (ref 43–77)
NITRITE UR-MCNC: NEGATIVE — SIGNIFICANT CHANGE UP
PH UR: 5 — SIGNIFICANT CHANGE UP (ref 5–8)
PLATELET # BLD AUTO: 269 K/UL — SIGNIFICANT CHANGE UP (ref 150–400)
POTASSIUM SERPL-MCNC: 3.9 MMOL/L — SIGNIFICANT CHANGE UP (ref 3.5–5.3)
POTASSIUM SERPL-SCNC: 3.9 MMOL/L — SIGNIFICANT CHANGE UP (ref 3.5–5.3)
PROT UR-MCNC: 15 MG/DL
RBC # BLD: 3.88 M/UL — SIGNIFICANT CHANGE UP (ref 3.8–5.2)
RBC # FLD: 12.6 % — SIGNIFICANT CHANGE UP (ref 10.3–14.5)
SODIUM SERPL-SCNC: 136 MMOL/L — SIGNIFICANT CHANGE UP (ref 135–145)
SP GR SPEC: 1.02 — SIGNIFICANT CHANGE UP (ref 1.01–1.02)
UROBILINOGEN FLD QL: NEGATIVE MG/DL — SIGNIFICANT CHANGE UP
WBC # BLD: 12.12 K/UL — HIGH (ref 3.8–10.5)
WBC # FLD AUTO: 12.12 K/UL — HIGH (ref 3.8–10.5)

## 2020-12-29 PROCEDURE — 74176 CT ABD & PELVIS W/O CONTRAST: CPT

## 2020-12-29 PROCEDURE — 87086 URINE CULTURE/COLONY COUNT: CPT

## 2020-12-29 PROCEDURE — 80048 BASIC METABOLIC PNL TOTAL CA: CPT

## 2020-12-29 PROCEDURE — 81001 URINALYSIS AUTO W/SCOPE: CPT

## 2020-12-29 PROCEDURE — 72125 CT NECK SPINE W/O DYE: CPT | Mod: 26

## 2020-12-29 PROCEDURE — 72125 CT NECK SPINE W/O DYE: CPT

## 2020-12-29 PROCEDURE — 99283 EMERGENCY DEPT VISIT LOW MDM: CPT

## 2020-12-29 PROCEDURE — 74176 CT ABD & PELVIS W/O CONTRAST: CPT | Mod: 26

## 2020-12-29 PROCEDURE — 99284 EMERGENCY DEPT VISIT MOD MDM: CPT

## 2020-12-29 PROCEDURE — 99284 EMERGENCY DEPT VISIT MOD MDM: CPT | Mod: 25

## 2020-12-29 PROCEDURE — 36415 COLL VENOUS BLD VENIPUNCTURE: CPT

## 2020-12-29 PROCEDURE — 85025 COMPLETE CBC W/AUTO DIFF WBC: CPT

## 2020-12-29 RX ORDER — LIDOCAINE 4 G/100G
2 CREAM TOPICAL ONCE
Refills: 0 | Status: COMPLETED | OUTPATIENT
Start: 2020-12-29 | End: 2020-12-29

## 2020-12-29 RX ORDER — ACETAMINOPHEN 500 MG
975 TABLET ORAL ONCE
Refills: 0 | Status: COMPLETED | OUTPATIENT
Start: 2020-12-29 | End: 2020-12-29

## 2020-12-29 RX ADMIN — LIDOCAINE 2 PATCH: 4 CREAM TOPICAL at 15:30

## 2020-12-29 RX ADMIN — Medication 975 MILLIGRAM(S): at 15:30

## 2020-12-29 NOTE — ED ADULT NURSE NOTE - NSIMPLEMENTINTERV_GEN_ALL_ED
Implemented All Fall Risk Interventions:  [place] to call system. Call bell, personal items and telephone within reach. Instruct patient to call for assistance. Room bathroom lighting operational. Non-slip footwear when patient is off stretcher. Physically safe environment: no spills, clutter or unnecessary equipment. Stretcher in lowest position, wheels locked, appropriate side rails in place. Provide visual cue, wrist band, yellow gown, etc. Monitor gait and stability. Monitor for mental status changes and reorient to person, place, and time. Review medications for side effects contributing to fall risk. Reinforce activity limits and safety measures with patient and family.

## 2020-12-29 NOTE — ED STATDOCS - PATIENT PORTAL LINK FT
You can access the FollowMyHealth Patient Portal offered by [facility] by registering at the following website: [email or web address]. By joining [employer]’s FollowMyHealth portal, you will also be able to view your health information using other applications (apps) compatible with our system.

## 2020-12-29 NOTE — ED STATDOCS - MUSCULOSKELETAL, MLM
range of motion is not limited +Paravertebral lumber and cervical tenderness, no midline tenderness.

## 2020-12-29 NOTE — ED STATDOCS - PROGRESS NOTE DETAILS
70 yr. old female PMH: TBI, Constipation, Pacemaker, Sleep Apnea, Lumbar Stenosis, Anxiety, HTN, Murmur, Bell's Palsy presents to ED with back pain and radiating to neck. Reports she fell on 11/20 hit her head and landed on back after loss of balance. Seen and examined by attending in intake. Recent Rx. Prednison by Dr. Parrish for her back pain that improved.  PLan: IV, Labs, CT abnd./pelvis,CT Chest and pain management. Will F/U with results and re evaluate. MTanginessai NP

## 2020-12-29 NOTE — ED STATDOCS - NSFOLLOWUPINSTRUCTIONS_ED_ALL_ED_FT
Back Pain    WHAT YOU NEED TO KNOW:    Back pain is common. It can be caused by many conditions, such as arthritis or the breakdown of spinal discs. Your risk for back pain is increased by injuries, lack of activity, or repeated bending and twisting. You may feel sore or stiff on one or both sides of your back. The pain may spread to your buttocks or thighs.    DISCHARGE INSTRUCTIONS:    Return to the emergency department if:     You have pain, numbness, or weakness in one or both legs.      Your pain becomes so severe that you cannot walk.      You cannot control your urine or bowel movements.      You have severe back pain with chest pain.      You have severe back pain, nausea, and vomiting.      You have severe back pain that spreads to your side or genital area.    Contact your healthcare provider if:     You have back pain that does not get better with rest and pain medicine.      You have a fever.      You have pain that worsens when you are on your back or when you rest.      You have pain that worsens when you cough or sneeze.      You lose weight without trying.      You have questions or concerns about your condition or care.    Medicines:     NSAIDs help decrease swelling and pain. This medicine is available with or without a doctor's order. NSAIDs can cause stomach bleeding or kidney problems in certain people. If you take blood thinner medicine, always ask your healthcare provider if NSAIDs are safe for you. Always read the medicine label and follow directions.      Acetaminophen decreases pain and fever. It is available without a doctor's order. Ask how much to take and how often to take it. Follow directions. Read the labels of all other medicines you are using to see if they also contain acetaminophen, or ask your doctor or pharmacist. Acetaminophen can cause liver damage if not taken correctly. Do not use more than 4 grams (4,000 milligrams) total of acetaminophen in one day.       Muscle relaxers help decrease muscle spasms and back pain.      Prescription pain medicine may be given. Ask your healthcare provider how to take this medicine safely. Some prescription pain medicines contain acetaminophen. Do not take other medicines that contain acetaminophen without talking to your healthcare provider. Too much acetaminophen may cause liver damage. Prescription pain medicine may cause constipation. Ask your healthcare provider how to prevent or treat constipation.       Take your medicine as directed. Contact your healthcare provider if you think your medicine is not helping or if you have side effects. Tell him or her if you are allergic to any medicine. Keep a list of the medicines, vitamins, and herbs you take. Include the amounts, and when and why you take them. Bring the list or the pill bottles to follow-up visits. Carry your medicine list with you in case of an emergency.    How to manage your back pain:     Apply ice on your back for 15 to 20 minutes every hour or as directed. Use an ice pack, or put crushed ice in a plastic bag. Cover it with a towel before you apply it to your skin. Ice helps prevent tissue damage and decreases pain.      Apply heat on your back for 20 to 30 minutes every 2 hours for as many days as directed. Heat helps decrease pain and muscle spasms.      Stay active as much as you can without causing more pain. Bed rest could make your back pain worse. Avoid heavy lifting until your pain is gone.      Go to physical therapy as directed. A physical therapist can teach you exercises to help improve movement and strength, and to decrease pain.    Follow up with your healthcare provider in 2 weeks, or as directed: Write down your questions so you remember to ask them during your visits.    Rest. Drink plenty of fluids.   Tylenol 650 mg. PO every 4 hrs. for pain.  Lidoderm Patch to affected area every 12 hrs. for pain.  Follow up with Spine specialist. Back Pain    WHAT YOU NEED TO KNOW:    Back pain is common. It can be caused by many conditions, such as arthritis or the breakdown of spinal discs. Your risk for back pain is increased by injuries, lack of activity, or repeated bending and twisting. You may feel sore or stiff on one or both sides of your back. The pain may spread to your buttocks or thighs.    DISCHARGE INSTRUCTIONS:    Return to the emergency department if:     You have pain, numbness, or weakness in one or both legs.      Your pain becomes so severe that you cannot walk.      You cannot control your urine or bowel movements.      You have severe back pain with chest pain.      You have severe back pain, nausea, and vomiting.      You have severe back pain that spreads to your side or genital area.    Contact your healthcare provider if:     You have back pain that does not get better with rest and pain medicine.      You have a fever.      You have pain that worsens when you are on your back or when you rest.      You have pain that worsens when you cough or sneeze.      You lose weight without trying.      You have questions or concerns about your condition or care.    Medicines:     NSAIDs help decrease swelling and pain. This medicine is available with or without a doctor's order. NSAIDs can cause stomach bleeding or kidney problems in certain people. If you take blood thinner medicine, always ask your healthcare provider if NSAIDs are safe for you. Always read the medicine label and follow directions.      Acetaminophen decreases pain and fever. It is available without a doctor's order. Ask how much to take and how often to take it. Follow directions. Read the labels of all other medicines you are using to see if they also contain acetaminophen, or ask your doctor or pharmacist. Acetaminophen can cause liver damage if not taken correctly. Do not use more than 4 grams (4,000 milligrams) total of acetaminophen in one day.       Muscle relaxers help decrease muscle spasms and back pain.      Prescription pain medicine may be given. Ask your healthcare provider how to take this medicine safely. Some prescription pain medicines contain acetaminophen. Do not take other medicines that contain acetaminophen without talking to your healthcare provider. Too much acetaminophen may cause liver damage. Prescription pain medicine may cause constipation. Ask your healthcare provider how to prevent or treat constipation.       Take your medicine as directed. Contact your healthcare provider if you think your medicine is not helping or if you have side effects. Tell him or her if you are allergic to any medicine. Keep a list of the medicines, vitamins, and herbs you take. Include the amounts, and when and why you take them. Bring the list or the pill bottles to follow-up visits. Carry your medicine list with you in case of an emergency.    How to manage your back pain:     Apply ice on your back for 15 to 20 minutes every hour or as directed. Use an ice pack, or put crushed ice in a plastic bag. Cover it with a towel before you apply it to your skin. Ice helps prevent tissue damage and decreases pain.      Apply heat on your back for 20 to 30 minutes every 2 hours for as many days as directed. Heat helps decrease pain and muscle spasms.      Stay active as much as you can without causing more pain. Bed rest could make your back pain worse. Avoid heavy lifting until your pain is gone.      Go to physical therapy as directed. A physical therapist can teach you exercises to help improve movement and strength, and to decrease pain.    Follow up with your healthcare provider in 2 weeks, or as directed: Write down your questions so you remember to ask them during your visit.                  NECK PAIN - Discharge Care           Neck Pain    WHAT YOU NEED TO KNOW:    You may have sudden neck pain that increases quickly. You may instead feel pain build slowly over time. Neck pain may go away in a few days or weeks, or it may continue for months. The pain may come and go, or be worse with certain movements. The pain may be only in your neck, or it may move to your arms, back, or shoulders. You may also have pain that starts in another body area and moves to your neck. Some types of neck pain are permanent.    Vertebral Column         DISCHARGE INSTRUCTIONS:    Seek care immediately if:   •You have an injury that causes neck pain and shooting pain down your arms or legs.      •Your neck pain suddenly becomes severe.      •You have neck pain along with numbness, tingling, or weakness in your arms or legs.      •You have a stiff neck, a headache, and a fever.      Contact your healthcare provider if:   •You have new or worsening symptoms.      •Your symptoms continue even after treatment.      •You have questions or concerns about your condition or care.      Medicines: You may need any of the following:   •NSAIDs, such as ibuprofen, help decrease swelling, pain, and fever. This medicine is available without a doctor's order. Ask your healthcare provider which medicine to take and how often to take it. Follow directions. NSAIDs can cause stomach bleeding or kidney problems if not taken correctly. If you take blood thinner medicine, always ask if NSAIDs are safe for you.      •Acetaminophen helps decrease pain and fever. Ask your healthcare provider how much to take and how often to take it. Follow directions. Acetaminophen can cause liver damage if not taken correctly.      •Steroid medicine may be used to reduce inflammation. This can help relieve pain caused by swelling.      •Take your medicine as directed. Contact your healthcare provider if you think your medicine is not helping or if you have side effects. Tell him or her if you are allergic to any medicine. Keep a list of the medicines, vitamins, and herbs you take. Include the amounts, and when and why you take them. Bring the list or the pill bottles to follow-up visits. Carry your medicine list with you in case of an emergency.      Manage or prevent neck pain:   •Rest your neck as directed. Do not make sudden movements, such as turning your head quickly. Your healthcare provider may recommend you wear a cervical collar for a short time. The collar will prevent you from moving your head. This will give your neck time to heal if an injury is causing your neck pain. Ask your healthcare provider when you can return to sports or other normal daily activities.      •Apply heat as directed. Heat helps relieve pain and swelling. Use a heat wrap, or soak a small towel in warm water. Wring out the extra water. Apply the heat wrap or towel for 20 minutes every hour, or as directed.      •Apply ice as directed. Ice helps relieve pain and swelling, and can help prevent tissue damage. Use an ice pack, or put ice in a bag. Cover the ice pack or back with a towel before you apply it to your neck. Apply the ice pack or ice for 15 minutes every hour, or as directed. Your healthcare provider can tell you how often to apply ice.      •Do neck exercises as directed. Neck exercises help strengthen the muscles and increase range of motion. Your healthcare provider will tell you which exercises are right for you. He may give you instructions, or he may recommend that you work with a physical therapist. Your healthcare provider or therapist can make sure you are doing the exercises correctly.       •Maintain good posture. Try to keep your head and shoulders lifted when you sit. If you work in front of a computer, make sure the monitor is at the right level. You should not need to look up down to see the screen. You should also not have to lean forward to be able to read what is on the screen. Make sure your keyboard, mouse, and other computer items are placed where you do not have to extend your shoulder to reach them. Get up often if you work in front of a computer or sit for long periods of time. Stretch or walk around to keep your neck muscles loose.      Follow up with your healthcare provider as directed: Your healthcare provider may refer you to a specialist if your pain does not get better with treatment. Write down your questions so you remember to ask them during your visits.          Rest. Drink plenty of fluids.   Tylenol 650 mg. PO every 4 hrs. for pain.  Lidoderm Patch to affected area every 12 hrs. for pain.  Follow up with Spine specialist.

## 2020-12-29 NOTE — ED STATDOCS - ATTENDING CONTRIBUTION TO CARE
I, Bettina Holt DO,  performed the initial face to face bedside interview with this patient regarding history of present illness, review of symptoms and relevant past medical, social and family history.  I completed an independent physical examination.  I was the initial provider who evaluated this patient. I have signed out the follow up of any pending tests (i.e. labs, radiological studies) to the ACP.  I have communicated the patient’s plan of care and disposition with the ACP.  The history, relevant review of systems, past medical and surgical history, medical decision making, and physical examination was documented by the scribe in my presence and I attest to the accuracy of the documentation.

## 2020-12-29 NOTE — ED STATDOCS - OBJECTIVE STATEMENT
69 y/o female with PMHx of TBI, Constipation, Urine retention, arrhythmia, pacemaker, sleep apnea, lumbar stenosis, anxiety, HTN, murmur, Bell's Palsy, hearing loss, osteoarthritis, Lyme disease, clinical depression presents to the ED c/o back pain. Pt states she fell on 11/20 with associated head trauma and landed on back from lack of balance, which pt states is chronic. Pt reports placed on Prednisone for 7 days by PCP Francois, which pt states improved back pain. Pt states back pain worsened last night, now radiating to neck. Denies urinary incontinence, fecal incontinence. ALLERGIES: Erythromycin.

## 2020-12-29 NOTE — ED STATDOCS - CARE PLAN
Principal Discharge DX:	Back pain   Principal Discharge DX:	Back pain  Secondary Diagnosis:	Neck pain

## 2020-12-30 LAB
CULTURE RESULTS: SIGNIFICANT CHANGE UP
SPECIMEN SOURCE: SIGNIFICANT CHANGE UP

## 2021-02-25 ENCOUNTER — APPOINTMENT (OUTPATIENT)
Dept: OBGYN | Facility: CLINIC | Age: 71
End: 2021-02-25

## 2021-03-09 ENCOUNTER — APPOINTMENT (OUTPATIENT)
Dept: CT IMAGING | Facility: CLINIC | Age: 71
End: 2021-03-09
Payer: MEDICARE

## 2021-03-09 ENCOUNTER — OUTPATIENT (OUTPATIENT)
Dept: OUTPATIENT SERVICES | Facility: HOSPITAL | Age: 71
LOS: 1 days | End: 2021-03-09
Payer: MEDICARE

## 2021-03-09 DIAGNOSIS — M70.62 TROCHANTERIC BURSITIS, LEFT HIP: ICD-10-CM

## 2021-03-09 DIAGNOSIS — Z96.7 PRESENCE OF OTHER BONE AND TENDON IMPLANTS: Chronic | ICD-10-CM

## 2021-03-09 DIAGNOSIS — Z98.890 OTHER SPECIFIED POSTPROCEDURAL STATES: Chronic | ICD-10-CM

## 2021-03-09 PROCEDURE — 73700 CT LOWER EXTREMITY W/O DYE: CPT | Mod: 26,LT,MH

## 2021-03-09 PROCEDURE — 76376 3D RENDER W/INTRP POSTPROCES: CPT

## 2021-03-09 PROCEDURE — 73700 CT LOWER EXTREMITY W/O DYE: CPT

## 2021-03-09 PROCEDURE — 76376 3D RENDER W/INTRP POSTPROCES: CPT | Mod: 26

## 2021-06-10 ENCOUNTER — NON-APPOINTMENT (OUTPATIENT)
Age: 71
End: 2021-06-10

## 2021-06-10 ENCOUNTER — APPOINTMENT (OUTPATIENT)
Dept: OBGYN | Facility: CLINIC | Age: 71
End: 2021-06-10
Payer: MEDICARE

## 2021-06-10 VITALS
DIASTOLIC BLOOD PRESSURE: 60 MMHG | BODY MASS INDEX: 24.99 KG/M2 | HEIGHT: 65 IN | SYSTOLIC BLOOD PRESSURE: 118 MMHG | WEIGHT: 150 LBS

## 2021-06-10 DIAGNOSIS — Z12.31 ENCOUNTER FOR SCREENING MAMMOGRAM FOR MALIGNANT NEOPLASM OF BREAST: ICD-10-CM

## 2021-06-10 DIAGNOSIS — Z12.11 ENCOUNTER FOR SCREENING FOR MALIGNANT NEOPLASM OF COLON: ICD-10-CM

## 2021-06-10 DIAGNOSIS — Z01.419 ENCOUNTER FOR GYNECOLOGICAL EXAMINATION (GENERAL) (ROUTINE) W/OUT ABNORMAL FINDINGS: ICD-10-CM

## 2021-06-10 PROCEDURE — 99213 OFFICE O/P EST LOW 20 MIN: CPT

## 2021-06-10 PROCEDURE — 82270 OCCULT BLOOD FECES: CPT

## 2021-06-10 RX ORDER — CHOLESTYRAMINE
POWDER (GRAM) MISCELLANEOUS
Refills: 0 | Status: COMPLETED | COMMUNITY
End: 2021-06-10

## 2021-06-10 NOTE — HISTORY OF PRESENT ILLNESS
[FreeTextEntry1] : 69 yo doing well no pmpbleeding , having night sweats now but has a hx of lym disease and when it acts up this will happen. She is on multiple meds for side effects of the lyme.  No other problems\par Dexa 2019 normal\par does guiac cards for pmd, never had colonoscopy wasn’t fully evacuated .

## 2021-06-10 NOTE — PHYSICAL EXAM

## 2021-06-12 LAB — HPV HIGH+LOW RISK DNA PNL CVX: NOT DETECTED

## 2021-06-15 ENCOUNTER — TRANSCRIPTION ENCOUNTER (OUTPATIENT)
Age: 71
End: 2021-06-15

## 2021-06-29 ENCOUNTER — TRANSCRIPTION ENCOUNTER (OUTPATIENT)
Age: 71
End: 2021-06-29

## 2021-06-29 ENCOUNTER — APPOINTMENT (OUTPATIENT)
Dept: OTOLARYNGOLOGY | Facility: CLINIC | Age: 71
End: 2021-06-29
Payer: MEDICARE

## 2021-06-29 VITALS
HEART RATE: 86 BPM | SYSTOLIC BLOOD PRESSURE: 117 MMHG | WEIGHT: 150 LBS | HEIGHT: 65 IN | TEMPERATURE: 97.7 F | BODY MASS INDEX: 24.99 KG/M2 | DIASTOLIC BLOOD PRESSURE: 62 MMHG

## 2021-06-29 DIAGNOSIS — G52.1 DISORDERS OF GLOSSOPHARYNGEAL NERVE: ICD-10-CM

## 2021-06-29 DIAGNOSIS — J31.0 CHRONIC RHINITIS: ICD-10-CM

## 2021-06-29 PROCEDURE — 31575 DIAGNOSTIC LARYNGOSCOPY: CPT

## 2021-06-29 PROCEDURE — 99214 OFFICE O/P EST MOD 30 MIN: CPT | Mod: 25

## 2021-06-29 RX ORDER — BENZONATATE 200 MG/1
200 CAPSULE ORAL
Qty: 60 | Refills: 2 | Status: ACTIVE | COMMUNITY
Start: 2021-06-29 | End: 1900-01-01

## 2021-06-29 RX ORDER — PREDNISONE 10 MG/1
10 TABLET ORAL TWICE DAILY
Qty: 20 | Refills: 1 | Status: ACTIVE | COMMUNITY
Start: 2021-06-29 | End: 1900-01-01

## 2021-06-29 RX ORDER — QUETIAPINE FUMARATE 25 MG/1
25 TABLET ORAL
Refills: 0 | Status: ACTIVE | COMMUNITY

## 2021-06-29 RX ORDER — FLUDROCORTISONE ACETATE 0.1 MG/1
TABLET ORAL
Refills: 0 | Status: ACTIVE | COMMUNITY

## 2021-06-29 NOTE — PROCEDURE
[de-identified] : Indication for procedure:Unable to examine laryngeal structures with mirror exam.  Difficulty w persistent throat discomfort and excessive\par throat clearing\par Scope # 86\par Topical anesthesia with viscous xylocaine 2% is applied to the anterior nares.\par A flexible fiberoptic laryngoscope is than introduced through the nares.\par The nasopharynx is clear without mass or inflammation.\par The posterior pharyngeal wall is unremarkable.\par The tongue base and vallecula are unremarkable.  The hypopharynx is unremarkable and unobstructed.\par The supraglottic larynx is within normal limits.\par Both vocal cords are fully mobile with no nodule, polyp or other lesion present.\par There is no edema or erythema overlying the arytenoid cartilages or the inter-arytenoid space.\par The subglottic space is clear.\par The voice has a normal quality.\par

## 2021-06-29 NOTE — REVIEW OF SYSTEMS
[Sinus Pain] : sinus pain [Sinus Pressure] : sinus pressure [Throat Clearing] : throat clearing [Throat Dryness] : throat dryness [Throat Itching] : throat itching [Cough] : cough [Negative] : Heme/Lymph [Patient Intake Form Reviewed] : Patient intake form was reviewed [FreeTextEntry1] : sweating at night

## 2021-06-29 NOTE — ASSESSMENT
[FreeTextEntry1] : tracheitis cough\par pharyngitis sicca due to anticholinergic side effects of meds\par pred 10 #20\par benzonatate 200 tid\par hydration

## 2021-06-29 NOTE — HISTORY OF PRESENT ILLNESS
[de-identified] : co nonproductive cough  x several weeks\par dry mouth sleep apnea\par cpap\par inhalant  allergies had desens in past\par neg hx pulmonary\par no pnd\par denies reflux\par hx sinusitis

## 2021-08-03 ENCOUNTER — TRANSCRIPTION ENCOUNTER (OUTPATIENT)
Age: 71
End: 2021-08-03

## 2022-03-21 NOTE — PROCEDURE NOTE - GENERAL INDICATIONS
I would prefer one a day.  I can only give a 30-day supply at a time.  He should be able to get it filled on the 23rd.  ALEA   Post-operative Pain

## 2022-05-06 ENCOUNTER — TRANSCRIPTION ENCOUNTER (OUTPATIENT)
Age: 72
End: 2022-05-06

## 2022-06-20 ENCOUNTER — NON-APPOINTMENT (OUTPATIENT)
Age: 72
End: 2022-06-20

## 2022-06-23 NOTE — ED PROVIDER NOTE - DR. NAME
Patient called back stating BS's have been low since yesterday. Running in the 60's. She did not take the Paxlovid today yet incase it is causing her glucose drop. How often to take glucose gummies while her sugars are low? Her appetite is not great since COVID. She is drinking orange juice. Any further suggestions? Andie

## 2022-06-24 ENCOUNTER — NON-APPOINTMENT (OUTPATIENT)
Age: 72
End: 2022-06-24

## 2022-06-29 ENCOUNTER — APPOINTMENT (OUTPATIENT)
Dept: UROLOGY | Facility: CLINIC | Age: 72
End: 2022-06-29

## 2022-06-29 VITALS
OXYGEN SATURATION: 95 % | HEART RATE: 93 BPM | DIASTOLIC BLOOD PRESSURE: 72 MMHG | WEIGHT: 155 LBS | HEIGHT: 65 IN | SYSTOLIC BLOOD PRESSURE: 135 MMHG | BODY MASS INDEX: 25.83 KG/M2

## 2022-06-29 PROCEDURE — 99213 OFFICE O/P EST LOW 20 MIN: CPT

## 2022-06-29 NOTE — HISTORY OF PRESENT ILLNESS
[FreeTextEntry1] : 71F presents today with a CC of an atonic bladder. Pt presents for evaluation of her medications. She has no complaints and feels she is doing well.

## 2022-06-29 NOTE — END OF VISIT
[FreeTextEntry3] : Her medications are refilled and urine is sent. Pt will follow up in 1 year or as needed.

## 2022-07-02 LAB
APPEARANCE: CLEAR
BACTERIA: NEGATIVE
BILIRUBIN URINE: NEGATIVE
BLOOD URINE: NEGATIVE
COLOR: NORMAL
GLUCOSE QUALITATIVE U: ABNORMAL
HYALINE CASTS: 0 /LPF
KETONES URINE: NEGATIVE
LEUKOCYTE ESTERASE URINE: NEGATIVE
MICROSCOPIC-UA: NORMAL
NITRITE URINE: NEGATIVE
PH URINE: 6
PROTEIN URINE: NEGATIVE
RED BLOOD CELLS URINE: 2 /HPF
SPECIFIC GRAVITY URINE: 1.01
SQUAMOUS EPITHELIAL CELLS: 0 /HPF
UROBILINOGEN URINE: NORMAL
WHITE BLOOD CELLS URINE: 0 /HPF

## 2022-07-05 LAB — BACTERIA UR CULT: NORMAL

## 2022-07-06 LAB — URINE CYTOLOGY: NORMAL

## 2022-07-07 ENCOUNTER — NON-APPOINTMENT (OUTPATIENT)
Age: 72
End: 2022-07-07

## 2022-07-30 ENCOUNTER — EMERGENCY (EMERGENCY)
Facility: HOSPITAL | Age: 72
LOS: 0 days | Discharge: LEFT BEFORE DISCHARGE | End: 2022-07-30
Attending: EMERGENCY MEDICINE
Payer: MEDICARE

## 2022-07-30 VITALS — HEIGHT: 65 IN | WEIGHT: 160.06 LBS

## 2022-07-30 VITALS
DIASTOLIC BLOOD PRESSURE: 61 MMHG | SYSTOLIC BLOOD PRESSURE: 142 MMHG | TEMPERATURE: 99 F | HEART RATE: 89 BPM | OXYGEN SATURATION: 100 % | RESPIRATION RATE: 18 BRPM

## 2022-07-30 DIAGNOSIS — Z98.890 OTHER SPECIFIED POSTPROCEDURAL STATES: Chronic | ICD-10-CM

## 2022-07-30 DIAGNOSIS — Z87.19 PERSONAL HISTORY OF OTHER DISEASES OF THE DIGESTIVE SYSTEM: ICD-10-CM

## 2022-07-30 DIAGNOSIS — I10 ESSENTIAL (PRIMARY) HYPERTENSION: ICD-10-CM

## 2022-07-30 DIAGNOSIS — Z53.29 PROCEDURE AND TREATMENT NOT CARRIED OUT BECAUSE OF PATIENT'S DECISION FOR OTHER REASONS: ICD-10-CM

## 2022-07-30 DIAGNOSIS — Z98.890 OTHER SPECIFIED POSTPROCEDURAL STATES: ICD-10-CM

## 2022-07-30 DIAGNOSIS — R41.82 ALTERED MENTAL STATUS, UNSPECIFIED: ICD-10-CM

## 2022-07-30 DIAGNOSIS — Z86.69 PERSONAL HISTORY OF OTHER DISEASES OF THE NERVOUS SYSTEM AND SENSE ORGANS: ICD-10-CM

## 2022-07-30 DIAGNOSIS — G47.30 SLEEP APNEA, UNSPECIFIED: ICD-10-CM

## 2022-07-30 DIAGNOSIS — Z79.01 LONG TERM (CURRENT) USE OF ANTICOAGULANTS: ICD-10-CM

## 2022-07-30 DIAGNOSIS — Z96.7 PRESENCE OF OTHER BONE AND TENDON IMPLANTS: Chronic | ICD-10-CM

## 2022-07-30 DIAGNOSIS — Z95.0 PRESENCE OF CARDIAC PACEMAKER: ICD-10-CM

## 2022-07-30 DIAGNOSIS — F32.A DEPRESSION, UNSPECIFIED: ICD-10-CM

## 2022-07-30 DIAGNOSIS — Z86.79 PERSONAL HISTORY OF OTHER DISEASES OF THE CIRCULATORY SYSTEM: ICD-10-CM

## 2022-07-30 DIAGNOSIS — Z88.1 ALLERGY STATUS TO OTHER ANTIBIOTIC AGENTS STATUS: ICD-10-CM

## 2022-07-30 DIAGNOSIS — M16.0 BILATERAL PRIMARY OSTEOARTHRITIS OF HIP: ICD-10-CM

## 2022-07-30 DIAGNOSIS — F41.9 ANXIETY DISORDER, UNSPECIFIED: ICD-10-CM

## 2022-07-30 DIAGNOSIS — R20.2 PARESTHESIA OF SKIN: ICD-10-CM

## 2022-07-30 PROCEDURE — 99283 EMERGENCY DEPT VISIT LOW MDM: CPT

## 2022-07-30 PROCEDURE — 99282 EMERGENCY DEPT VISIT SF MDM: CPT

## 2022-07-30 NOTE — ED ADULT NURSE NOTE - OBJECTIVE STATEMENT
pt presented for "bugs under her skin and in her bed", MD in to evaluate, and pt immediately left room to go home, did not wait for paperwork, pls see MD note. RN did not see pt

## 2022-07-30 NOTE — ED PROVIDER NOTE - PHYSICAL EXAMINATION
Constitutional: NAD, well appearing  HEENT: no rhinorrhea, PERRL, no oropharyngeal erythema or exudates, midline uvula.  TMs clear.  CVS:  RRR, no m/r/g  Resp:  CTAB  GI: soft, ntnd  MSK:  no restriction to rom, full ROM to all extremities  Neuro:  A&Ox3, 5/5 strength to all extremities,  SILT to all extremities  Skin: no rash  psych: clear thought content, No SI/HI  Heme/lymph:  No LAD

## 2022-07-30 NOTE — ED PROVIDER NOTE - NSICDXPASTSURGICALHX_GEN_ALL_CORE_FT
PAST SURGICAL HISTORY:  H/O shoulder surgery fanta procedure - 1986    History of Tonsillectomy age 22    S/P Arthroscopy of Left Knee 1990's    S/P Laparoscopic Cholecystectomy 15 yrs ago    S/P ORIF (open reduction internal fixation) fracture right leg - 2011    S/P Rhinoplasty 1970's

## 2022-07-30 NOTE — ED PROVIDER NOTE - NSICDXPASTMEDICALHX_GEN_ALL_CORE_FT
PAST MEDICAL HISTORY:  Anxiety     Arrhythmia pacemaker placed in 2011    Bell's palsy     Clinical Depression     Constipation     Hearing loss slight    Heart murmur     HTN (hypertension)     Lumbar stenosis     Lyme Disease     Osteoarthritis of both hips     Pacemaker     Sleep apnea uses mouthpiece    TBI (traumatic brain injury) personal history of TBI    Urine retention

## 2022-07-30 NOTE — ED PROVIDER NOTE - NS ED ROS FT
Constitutional: nad, well appearing  HEENT:  no nasal congestion, eye drainage or ear pain.    CVS:  no cp  Resp:  No sob, no cough  GI:  no abdominal pain, no nausea or vomiting  :  no dysuria  MSK: no joint pain or limited ROM  Skin: no rash  Psych: no SI, no HI, no hallucinations  Neuro: no change in mental status or level of consciousness  Heme/lymph: no bleeding

## 2022-07-30 NOTE — ED PROVIDER NOTE - CLINICAL SUMMARY MEDICAL DECISION MAKING FREE TEXT BOX
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 no acute intervention at this time, likely Pt d/c No HX of auditory hallucinations, consider Morgellons disease.  pt is well appearing and lucid.  Do not suspect delirium.  No si/hi.  No command hallucinations.  Pt upset that I discussed possible psychiatric conditions and wanted to leave and eloped.  Just prior to eloping I did advise labs and ua for eval.  Pt not a danger to self or others.  Ambulating without issue.  Is not delirious.  1:1 not indicated.

## 2022-07-30 NOTE — ED ADULT TRIAGE NOTE - CHIEF COMPLAINT QUOTE
Pt presents to the ED c/o bugs in her bed and under her skin. No bugs noted. Pt talking to herself in triage. Denies visual or auditory hallucinations. Denies SI/HI.

## 2022-07-30 NOTE — ED PROVIDER NOTE - OBJECTIVE STATEMENT
70 y/o female with a PMHx of neurolyme disease, depression presents to the ED c/o AMS. States there are bugs in her bed and under her skin. Patient reports she is able to see them. Pt has been cleaning out her parent's room. No bugs noted. In ED, pt is talking to herself. Denies visual or auditory hallucinations. Denies SI/HI. Denies fever, chills, burning urination, abd pain. States she can se the bugs moving on her arms. Lives alone. No EtOH use or drug use. Denies having psych HX.

## 2022-08-16 ENCOUNTER — OUTPATIENT (OUTPATIENT)
Dept: OUTPATIENT SERVICES | Facility: HOSPITAL | Age: 72
LOS: 1 days | End: 2022-08-16
Payer: MEDICARE

## 2022-08-16 ENCOUNTER — RESULT REVIEW (OUTPATIENT)
Age: 72
End: 2022-08-16

## 2022-08-16 ENCOUNTER — APPOINTMENT (OUTPATIENT)
Dept: MAMMOGRAPHY | Facility: CLINIC | Age: 72
End: 2022-08-16

## 2022-08-16 DIAGNOSIS — Z00.00 ENCOUNTER FOR GENERAL ADULT MEDICAL EXAMINATION WITHOUT ABNORMAL FINDINGS: ICD-10-CM

## 2022-08-16 DIAGNOSIS — Z98.890 OTHER SPECIFIED POSTPROCEDURAL STATES: Chronic | ICD-10-CM

## 2022-08-16 DIAGNOSIS — Z96.7 PRESENCE OF OTHER BONE AND TENDON IMPLANTS: Chronic | ICD-10-CM

## 2022-08-16 PROCEDURE — 77067 SCR MAMMO BI INCL CAD: CPT | Mod: 26

## 2022-08-16 PROCEDURE — 77063 BREAST TOMOSYNTHESIS BI: CPT | Mod: 26

## 2022-08-16 PROCEDURE — 77067 SCR MAMMO BI INCL CAD: CPT

## 2022-08-16 PROCEDURE — 77063 BREAST TOMOSYNTHESIS BI: CPT

## 2022-09-16 ENCOUNTER — NON-APPOINTMENT (OUTPATIENT)
Age: 72
End: 2022-09-16

## 2022-11-22 ENCOUNTER — NON-APPOINTMENT (OUTPATIENT)
Age: 72
End: 2022-11-22

## 2022-11-22 DIAGNOSIS — Z86.79 PERSONAL HISTORY OF OTHER DISEASES OF THE CIRCULATORY SYSTEM: ICD-10-CM

## 2022-11-22 DIAGNOSIS — M70.62 TROCHANTERIC BURSITIS, LEFT HIP: ICD-10-CM

## 2022-11-22 DIAGNOSIS — M70.71 OTHER BURSITIS OF HIP, RIGHT HIP: ICD-10-CM

## 2022-11-22 DIAGNOSIS — S82.241S: ICD-10-CM

## 2022-11-22 DIAGNOSIS — M25.562 PAIN IN LEFT KNEE: ICD-10-CM

## 2022-11-22 DIAGNOSIS — Z82.69 FAMILY HISTORY OF OTHER DISEASES OF THE MUSCULOSKELETAL SYSTEM AND CONNECTIVE TISSUE: ICD-10-CM

## 2022-11-22 DIAGNOSIS — M70.61 TROCHANTERIC BURSITIS, RIGHT HIP: ICD-10-CM

## 2022-11-22 DIAGNOSIS — M25.561 PAIN IN RIGHT KNEE: ICD-10-CM

## 2022-11-22 DIAGNOSIS — S70.02XA CONTUSION OF LEFT HIP, INITIAL ENCOUNTER: ICD-10-CM

## 2022-11-22 DIAGNOSIS — M25.511 PAIN IN RIGHT SHOULDER: ICD-10-CM

## 2022-11-22 DIAGNOSIS — M75.51 BURSITIS OF RIGHT SHOULDER: ICD-10-CM

## 2022-11-22 RX ORDER — LAMOTRIGINE 150 MG/1
150 TABLET ORAL DAILY
Refills: 0 | Status: ACTIVE | COMMUNITY

## 2022-11-22 RX ORDER — FLUDROCORTISONE ACETATE 0.1 MG/1
0.1 TABLET ORAL
Refills: 0 | Status: ACTIVE | COMMUNITY

## 2022-11-22 RX ORDER — PAROXETINE 25 MG/1
25 TABLET, FILM COATED, EXTENDED RELEASE ORAL DAILY
Refills: 0 | Status: ACTIVE | COMMUNITY

## 2022-11-22 RX ORDER — QUETIAPINE FUMARATE 200 MG/1
200 TABLET ORAL
Refills: 0 | Status: ACTIVE | COMMUNITY

## 2022-11-22 RX ORDER — PREGABALIN 50 MG/1
50 CAPSULE ORAL
Refills: 0 | Status: ACTIVE | COMMUNITY

## 2022-11-22 RX ORDER — NORTRIPTYLINE HYDROCHLORIDE 50 MG/1
50 CAPSULE ORAL DAILY
Refills: 0 | Status: ACTIVE | COMMUNITY

## 2023-01-10 ENCOUNTER — EMERGENCY (EMERGENCY)
Facility: HOSPITAL | Age: 73
LOS: 0 days | Discharge: HOME | End: 2023-01-10
Attending: EMERGENCY MEDICINE
Payer: COMMERCIAL

## 2023-01-10 VITALS
HEART RATE: 100 BPM | OXYGEN SATURATION: 99 % | RESPIRATION RATE: 18 BRPM | DIASTOLIC BLOOD PRESSURE: 89 MMHG | HEIGHT: 65 IN | TEMPERATURE: 98 F | WEIGHT: 115.08 LBS | SYSTOLIC BLOOD PRESSURE: 114 MMHG

## 2023-01-10 DIAGNOSIS — Z53.33 ARTHROSCOPIC SURGICAL PROCEDURE CONVERTED TO OPEN PROCEDURE: ICD-10-CM

## 2023-01-10 DIAGNOSIS — M54.50 LOW BACK PAIN, UNSPECIFIED: ICD-10-CM

## 2023-01-10 DIAGNOSIS — I10 ESSENTIAL (PRIMARY) HYPERTENSION: ICD-10-CM

## 2023-01-10 DIAGNOSIS — Z95.0 PRESENCE OF CARDIAC PACEMAKER: ICD-10-CM

## 2023-01-10 DIAGNOSIS — M48.061 SPINAL STENOSIS, LUMBAR REGION WITHOUT NEUROGENIC CLAUDICATION: ICD-10-CM
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
## 2023-01-10 DIAGNOSIS — F41.8 OTHER SPECIFIED ANXIETY DISORDERS: ICD-10-CM
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
## 2023-01-10 DIAGNOSIS — M16.2 BILATERAL OSTEOARTHRITIS RESULTING FROM HIP DYSPLASIA: ICD-10-CM

## 2023-01-10 DIAGNOSIS — M54.42 LUMBAGO WITH SCIATICA, LEFT SIDE: ICD-10-CM

## 2023-01-10 DIAGNOSIS — Z96.7 PRESENCE OF OTHER BONE AND TENDON IMPLANTS: Chronic | ICD-10-CM

## 2023-01-10 DIAGNOSIS — G51.0 BELL'S PALSY: ICD-10-CM

## 2023-01-10 DIAGNOSIS — Z42.8 ENCOUNTER FOR OTHER PLASTIC AND RECONSTRUCTIVE SURGERY FOLLOWING MEDICAL PROCEDURE OR HEALED INJURY: ICD-10-CM

## 2023-01-10 DIAGNOSIS — Z98.890 OTHER SPECIFIED POSTPROCEDURAL STATES: Chronic | ICD-10-CM

## 2023-01-10 DIAGNOSIS — H91.90 UNSPECIFIED HEARING LOSS, UNSPECIFIED EAR: ICD-10-CM

## 2023-01-10 DIAGNOSIS — Z88.1 ALLERGY STATUS TO OTHER ANTIBIOTIC AGENTS STATUS: ICD-10-CM

## 2023-01-10 PROCEDURE — 74176 CT ABD & PELVIS W/O CONTRAST: CPT | Mod: 26,MA

## 2023-01-10 PROCEDURE — 99284 EMERGENCY DEPT VISIT MOD MDM: CPT | Mod: 25

## 2023-01-10 PROCEDURE — 99284 EMERGENCY DEPT VISIT MOD MDM: CPT

## 2023-01-10 PROCEDURE — 74176 CT ABD & PELVIS W/O CONTRAST: CPT | Mod: MA
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
## 2023-01-10 NOTE — ED STATDOCS - PATIENT PORTAL LINK FT
You can access the FollowMyHealth Patient Portal offered by [facility] by registering at the following website: [email or web address]. By joining [employer]’s FollowMyHealth portal, you will also be able to view your health information using other applications (apps) compatible with our system.
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
## 2023-01-10 NOTE — ED STATDOCS - ATTENDING APP SHARED VISIT CONTRIBUTION OF CARE
I, Chasidy Chiu MD,  performed the initial face to face bedside interview with this patient regarding history of present illness, review of symptoms and relevant past medical, social and family history.  I completed an independent physical examination.  I was the initial provider who evaluated this patient.   I personally saw the patient and performed a substantive portion of the visit including all aspects of the medical decision making.  I have signed out the follow up of any pending tests (i.e. labs, radiological studies) to the ОЛЕГ.  I have communicated the patient’s plan of care and disposition with the ОЛЕГ.

## 2023-01-26 ENCOUNTER — OUTPATIENT (OUTPATIENT)
Dept: OUTPATIENT SERVICES | Facility: HOSPITAL | Age: 73
LOS: 1 days | End: 2023-01-26
Payer: MEDICARE

## 2023-01-26 ENCOUNTER — APPOINTMENT (OUTPATIENT)
Dept: RADIOLOGY | Facility: CLINIC | Age: 73
End: 2023-01-26
Payer: MEDICARE

## 2023-01-26 DIAGNOSIS — M16.12 UNILATERAL PRIMARY OSTEOARTHRITIS, LEFT HIP: ICD-10-CM

## 2023-01-26 DIAGNOSIS — M47.896 OTHER SPONDYLOSIS, LUMBAR REGION: ICD-10-CM

## 2023-01-26 PROCEDURE — 73501 X-RAY EXAM HIP UNI 1 VIEW: CPT

## 2023-01-26 PROCEDURE — 73501 X-RAY EXAM HIP UNI 1 VIEW: CPT | Mod: 26,LT

## 2023-02-10 ENCOUNTER — EMERGENCY (EMERGENCY)
Facility: HOSPITAL | Age: 73
LOS: 0 days | Discharge: HOME | End: 2023-02-10
Attending: EMERGENCY MEDICINE
Payer: MEDICARE

## 2023-02-10 VITALS
OXYGEN SATURATION: 99 % | SYSTOLIC BLOOD PRESSURE: 140 MMHG | HEART RATE: 91 BPM | DIASTOLIC BLOOD PRESSURE: 71 MMHG | RESPIRATION RATE: 17 BRPM

## 2023-02-10 VITALS
SYSTOLIC BLOOD PRESSURE: 151 MMHG | DIASTOLIC BLOOD PRESSURE: 84 MMHG | RESPIRATION RATE: 18 BRPM | OXYGEN SATURATION: 94 % | HEART RATE: 76 BPM | TEMPERATURE: 99 F

## 2023-02-10 DIAGNOSIS — G89.29 OTHER CHRONIC PAIN: ICD-10-CM

## 2023-02-10 DIAGNOSIS — R45.1 RESTLESSNESS AND AGITATION: ICD-10-CM

## 2023-02-10 DIAGNOSIS — F43.20 ADJUSTMENT DISORDER, UNSPECIFIED: ICD-10-CM

## 2023-02-10 DIAGNOSIS — F41.9 ANXIETY DISORDER, UNSPECIFIED: ICD-10-CM

## 2023-02-10 DIAGNOSIS — R41.82 ALTERED MENTAL STATUS, UNSPECIFIED: ICD-10-CM

## 2023-02-10 DIAGNOSIS — Z20.822 CONTACT WITH AND (SUSPECTED) EXPOSURE TO COVID-19: ICD-10-CM

## 2023-02-10 DIAGNOSIS — M54.40 LUMBAGO WITH SCIATICA, UNSPECIFIED SIDE: ICD-10-CM

## 2023-02-10 DIAGNOSIS — G52.7 DISORDERS OF MULTIPLE CRANIAL NERVES: ICD-10-CM

## 2023-02-10 DIAGNOSIS — A69.20 LYME DISEASE, UNSPECIFIED: ICD-10-CM

## 2023-02-10 DIAGNOSIS — F22 DELUSIONAL DISORDERS: ICD-10-CM

## 2023-02-10 DIAGNOSIS — Z96.7 PRESENCE OF OTHER BONE AND TENDON IMPLANTS: Chronic | ICD-10-CM

## 2023-02-10 DIAGNOSIS — Z98.890 OTHER SPECIFIED POSTPROCEDURAL STATES: Chronic | ICD-10-CM

## 2023-02-10 DIAGNOSIS — M54.50 LOW BACK PAIN, UNSPECIFIED: ICD-10-CM

## 2023-02-10 LAB
ALBUMIN SERPL ELPH-MCNC: 3.7 G/DL — SIGNIFICANT CHANGE UP (ref 3.3–5)
ALP SERPL-CCNC: 61 U/L — SIGNIFICANT CHANGE UP (ref 40–120)
ALT FLD-CCNC: 16 U/L — SIGNIFICANT CHANGE UP (ref 12–78)
AMPHET UR-MCNC: NEGATIVE — SIGNIFICANT CHANGE UP
ANION GAP SERPL CALC-SCNC: 5 MMOL/L — SIGNIFICANT CHANGE UP (ref 5–17)
APAP SERPL-MCNC: <2 UG/ML — LOW (ref 10–30)
APPEARANCE UR: CLEAR — SIGNIFICANT CHANGE UP
AST SERPL-CCNC: 21 U/L — SIGNIFICANT CHANGE UP (ref 15–37)
BARBITURATES UR SCN-MCNC: NEGATIVE — SIGNIFICANT CHANGE UP
BASOPHILS # BLD AUTO: 0.04 K/UL — SIGNIFICANT CHANGE UP (ref 0–0.2)
BASOPHILS NFR BLD AUTO: 0.4 % — SIGNIFICANT CHANGE UP (ref 0–2)
BENZODIAZ UR-MCNC: NEGATIVE — SIGNIFICANT CHANGE UP
BILIRUB SERPL-MCNC: 0.6 MG/DL — SIGNIFICANT CHANGE UP (ref 0.2–1.2)
BILIRUB UR-MCNC: NEGATIVE — SIGNIFICANT CHANGE UP
BUN SERPL-MCNC: 21 MG/DL — SIGNIFICANT CHANGE UP (ref 7–23)
CALCIUM SERPL-MCNC: 8.9 MG/DL — SIGNIFICANT CHANGE UP (ref 8.5–10.1)
CHLORIDE SERPL-SCNC: 107 MMOL/L — SIGNIFICANT CHANGE UP (ref 96–108)
CO2 SERPL-SCNC: 28 MMOL/L — SIGNIFICANT CHANGE UP (ref 22–31)
COCAINE METAB.OTHER UR-MCNC: NEGATIVE — SIGNIFICANT CHANGE UP
COLOR SPEC: YELLOW — SIGNIFICANT CHANGE UP
CREAT SERPL-MCNC: 0.6 MG/DL — SIGNIFICANT CHANGE UP (ref 0.5–1.3)
DIFF PNL FLD: NEGATIVE — SIGNIFICANT CHANGE UP
EGFR: 94 ML/MIN/1.73M2 — SIGNIFICANT CHANGE UP
EOSINOPHIL # BLD AUTO: 0.07 K/UL — SIGNIFICANT CHANGE UP (ref 0–0.5)
EOSINOPHIL NFR BLD AUTO: 0.8 % — SIGNIFICANT CHANGE UP (ref 0–6)
ETHANOL SERPL-MCNC: <10 MG/DL — SIGNIFICANT CHANGE UP (ref 0–10)
FLUAV AG NPH QL: SIGNIFICANT CHANGE UP
FLUBV AG NPH QL: SIGNIFICANT CHANGE UP
GLUCOSE SERPL-MCNC: 195 MG/DL — HIGH (ref 70–99)
GLUCOSE UR QL: 100 MG/DL
HCT VFR BLD CALC: 37.9 % — SIGNIFICANT CHANGE UP (ref 34.5–45)
HGB BLD-MCNC: 12.2 G/DL — SIGNIFICANT CHANGE UP (ref 11.5–15.5)
IMM GRANULOCYTES NFR BLD AUTO: 0.3 % — SIGNIFICANT CHANGE UP (ref 0–0.9)
KETONES UR-MCNC: ABNORMAL
LEUKOCYTE ESTERASE UR-ACNC: NEGATIVE — SIGNIFICANT CHANGE UP
LYMPHOCYTES # BLD AUTO: 1.05 K/UL — SIGNIFICANT CHANGE UP (ref 1–3.3)
LYMPHOCYTES # BLD AUTO: 11.7 % — LOW (ref 13–44)
MCHC RBC-ENTMCNC: 31 PG — SIGNIFICANT CHANGE UP (ref 27–34)
MCHC RBC-ENTMCNC: 32.2 GM/DL — SIGNIFICANT CHANGE UP (ref 32–36)
MCV RBC AUTO: 96.4 FL — SIGNIFICANT CHANGE UP (ref 80–100)
METHADONE UR-MCNC: NEGATIVE — SIGNIFICANT CHANGE UP
MONOCYTES # BLD AUTO: 0.66 K/UL — SIGNIFICANT CHANGE UP (ref 0–0.9)
MONOCYTES NFR BLD AUTO: 7.3 % — SIGNIFICANT CHANGE UP (ref 2–14)
NEUTROPHILS # BLD AUTO: 7.15 K/UL — SIGNIFICANT CHANGE UP (ref 1.8–7.4)
NEUTROPHILS NFR BLD AUTO: 79.5 % — HIGH (ref 43–77)
NITRITE UR-MCNC: NEGATIVE — SIGNIFICANT CHANGE UP
OPIATES UR-MCNC: NEGATIVE — SIGNIFICANT CHANGE UP
PCP SPEC-MCNC: SIGNIFICANT CHANGE UP
PCP UR-MCNC: NEGATIVE — SIGNIFICANT CHANGE UP
PH UR: 6 — SIGNIFICANT CHANGE UP (ref 5–8)
PLATELET # BLD AUTO: 237 K/UL — SIGNIFICANT CHANGE UP (ref 150–400)
POTASSIUM SERPL-MCNC: 3.2 MMOL/L — LOW (ref 3.5–5.3)
POTASSIUM SERPL-SCNC: 3.2 MMOL/L — LOW (ref 3.5–5.3)
PROT SERPL-MCNC: 6.4 GM/DL — SIGNIFICANT CHANGE UP (ref 6–8.3)
PROT UR-MCNC: NEGATIVE — SIGNIFICANT CHANGE UP
RBC # BLD: 3.93 M/UL — SIGNIFICANT CHANGE UP (ref 3.8–5.2)
RBC # FLD: 12.5 % — SIGNIFICANT CHANGE UP (ref 10.3–14.5)
RSV RNA NPH QL NAA+NON-PROBE: SIGNIFICANT CHANGE UP
SALICYLATES SERPL-MCNC: <1.7 MG/DL — LOW (ref 2.8–20)
SARS-COV-2 RNA SPEC QL NAA+PROBE: SIGNIFICANT CHANGE UP
SODIUM SERPL-SCNC: 140 MMOL/L — SIGNIFICANT CHANGE UP (ref 135–145)
SP GR SPEC: 1.01 — SIGNIFICANT CHANGE UP (ref 1.01–1.02)
THC UR QL: NEGATIVE — SIGNIFICANT CHANGE UP
TSH SERPL-MCNC: 1.5 UU/ML — SIGNIFICANT CHANGE UP (ref 0.34–4.82)
UROBILINOGEN FLD QL: NEGATIVE — SIGNIFICANT CHANGE UP
WBC # BLD: 9 K/UL — SIGNIFICANT CHANGE UP (ref 3.8–10.5)
WBC # FLD AUTO: 9 K/UL — SIGNIFICANT CHANGE UP (ref 3.8–10.5)

## 2023-02-10 PROCEDURE — 71045 X-RAY EXAM CHEST 1 VIEW: CPT | Mod: 26

## 2023-02-10 PROCEDURE — 81003 URINALYSIS AUTO W/O SCOPE: CPT

## 2023-02-10 PROCEDURE — 99285 EMERGENCY DEPT VISIT HI MDM: CPT

## 2023-02-10 PROCEDURE — 99285 EMERGENCY DEPT VISIT HI MDM: CPT | Mod: 25

## 2023-02-10 PROCEDURE — 85025 COMPLETE CBC W/AUTO DIFF WBC: CPT

## 2023-02-10 PROCEDURE — 80053 COMPREHEN METABOLIC PANEL: CPT

## 2023-02-10 PROCEDURE — 93005 ELECTROCARDIOGRAM TRACING: CPT

## 2023-02-10 PROCEDURE — 36415 COLL VENOUS BLD VENIPUNCTURE: CPT

## 2023-02-10 PROCEDURE — 84443 ASSAY THYROID STIM HORMONE: CPT

## 2023-02-10 PROCEDURE — 80307 DRUG TEST PRSMV CHEM ANLYZR: CPT

## 2023-02-10 PROCEDURE — 93010 ELECTROCARDIOGRAM REPORT: CPT

## 2023-02-10 PROCEDURE — 0241U: CPT

## 2023-02-10 PROCEDURE — 90791 PSYCH DIAGNOSTIC EVALUATION: CPT

## 2023-02-10 PROCEDURE — 70450 CT HEAD/BRAIN W/O DYE: CPT | Mod: 26,MA

## 2023-02-10 PROCEDURE — 96374 THER/PROPH/DIAG INJ IV PUSH: CPT

## 2023-02-10 PROCEDURE — 87086 URINE CULTURE/COLONY COUNT: CPT

## 2023-02-10 PROCEDURE — 70450 CT HEAD/BRAIN W/O DYE: CPT | Mod: MA

## 2023-02-10 PROCEDURE — 71045 X-RAY EXAM CHEST 1 VIEW: CPT

## 2023-02-10 RX ORDER — POTASSIUM CHLORIDE 20 MEQ
40 PACKET (EA) ORAL ONCE
Refills: 0 | Status: COMPLETED | OUTPATIENT
Start: 2023-02-10 | End: 2023-02-10

## 2023-02-10 RX ADMIN — Medication 1 MILLIGRAM(S): at 05:41

## 2023-02-10 RX ADMIN — Medication 40 MILLIEQUIVALENT(S): at 07:09

## 2023-02-10 NOTE — ED BEHAVIORAL HEALTH ASSESSMENT NOTE - TELEPSYCHIATRY?
Subjective: Tenisha Austin is a 31 year old female who presents today for follow-up of her depression and anxiety.  She states she feels like she is not as sad as she used to be but does feel that she is somewhat \" numb” on her current combination of medications.  She shares with me that she was getting bullied at work so switched from 2nd shift a 3rd shift and so far this transition is going very well.  She is been on this shift for about a month.  She states she is sleeping well and overall really enjoying this shift.  She denies any suicidal or homicidal ideation, intent, plan.  She does feel that the venlafaxine that was most recently added was very helpful.    She remains very frustrated with her weight.  She knows that she is overeating at times and primarily does this out of convenience.  She states she is not bingeing like she had been in the past.  She would be interested in trying something to help with her weight loss.  She had been on phentermine in the past but this made her “emotional”.  She did not fill this Saxenda when this was prescribed due to the cost of the medication.    ALLERGIES:  No Known Allergies      Outpatient Medications Marked as Taking for the 12/12/22 encounter (Office Visit) with Urvashi De La Paz PA-C   Medication Sig Dispense Refill   • Cholecalciferol (Vitamin D3) 50 mcg (2,000 units) capsule Take 4,000 Units by mouth daily.     • Omega-3 Fatty Acids (OMEGA 3 PO)      • Multiple Vitamin (MULTIVITAMIN ADULT PO)      • Probiotic Product (PROBIOTIC PO)      • buPROPion XL (WELLBUTRIN XL) 150 MG 24 hr tablet Take 1 tablet by mouth daily. 90 tablet 0   • venlafaxine XR (Effexor XR) 75 MG 24 hr capsule Take 1 capsule by mouth daily. 90 capsule 3   • busPIRone (BUSPAR) 10 MG tablet Take 1 tablet by mouth 2 times daily. 180 tablet 3   • loratadine (CLARITIN) 10 MG tablet Take 10 mg by mouth daily.         Objective:  Visit Vitals  /82   Pulse 98   Temp 96.4 °F (35.8 °C)   Resp 16    Ht 5' 6\" (1.676 m)   Wt (!) 152.1 kg (335 lb 5.1 oz)   LMP 05/18/2022   SpO2 99%   BMI 54.12 kg/m²     In general: She is alert, oriented, cooperative.  She is in no acute distress.  She is appropriately groomed and dressed.  She carries on normal conversation today.  She denies any suicidal or homicidal ideation, intent, plan.      She completes a PHQ-9 questionnaire today scoring herself at 8 and ANAI-7 questionnaire today scoring herself at 6.    Assessment and Plan:  1. Depression and anxiety.  She feels like the current medication combination is making her feel numb and she would like to modify this if possible.  She does feel it is helping with her sadness however.  At this point she will reduce her Wellbutrin down to 150 mg a day.  She will continue Effexor and BuSpar at current dose.  I will see her back in 1 month for close follow-up in recheck.  2. Morbid obesity, current BMI 54.12.  History of binge eating.  She does not feel she is bingeing like she had been.  She does feel motivated to try something to help with weight loss.  She did not tolerate phentermine 37.5 mg a day as she felt this affected her moods in the past.  She is on bupropion.  She could not afford Saxenda.  We discussed risks and benefits of Qsymia.  I am hopeful she will tolerate this as it is lower dose phentermine.  PDMP is reviewed and prescription for both the starting and 2nd dose are sent to the pharmacy.  Discussed goal would be weight loss of 17 lb in 3 months.  She voices understanding.  We did briefly discuss bariatric surgery but she declines that at this time.      Follow-up in 1 month for close follow-up in recheck.  Follow-up acutely with any new or different symptoms.  All of her questions were answered and she leaves in stable condition today.    Urvashi De La Paz PA-C    Supervising Physician:  Dr. Eduardo Beverly     No
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
## 2023-02-10 NOTE — CHART NOTE - NSCHARTNOTEFT_GEN_A_CORE
Met with patient and family at bedside. Patient was able to ambulate to the bathroom with the aid of a nurse and a walker. She was very, very unsteady. she has excruciating back pain which she claims is hindering her mobility. She is willing to go to sub acute rehab. She agrees to go to [facility]. We will arrange for transport.

## 2023-02-10 NOTE — ED BEHAVIORAL HEALTH ASSESSMENT NOTE - NSBHATTESTAPPBILLTIME_PSY_A_CORE
I attest my time as ОЛЕГ is greater than 50% of the total combined time spent on qualifying patient care activities. I have reviewed and verified the documentation.

## 2023-02-10 NOTE — ED PROVIDER NOTE - CLINICAL SUMMARY MEDICAL DECISION MAKING FREE TEXT BOX
patient with unknown medical history brought in by EMS for evaluation acting erratically when she was knocking on her neighbors door.  The patient appears to be paranoid and agitated.  The patient does not have any cranial nerve deficits, focal deficits or slurred speech.  Plan: Medical clearance for psychiatric evaluation to include labs, CT head, chest x-ray

## 2023-02-10 NOTE — ED PROVIDER NOTE - DIFFERENTIAL DIAGNOSIS
Differential Diagnosis Delirium due to infection or metabolic derangement, intracranial hemorrhage, acute psychosis

## 2023-02-10 NOTE — ED ADULT NURSE NOTE - NSIMPLEMENTINTERV_GEN_ALL_ED
Implemented All Fall Risk Interventions:  [place] to call system. Call bell, personal items and telephone within reach. Instruct patient to call for assistance. Room bathroom lighting operational. Non-slip footwear when patient is off stretcher. Physically safe environment: no spills, clutter or unnecessary equipment. Stretcher in lowest position, wheels locked, appropriate side rails in place. Provide visual cue, wrist band, yellow gown, etc. Monitor gait and stability. Monitor for mental status changes and reorient to person, place, and time. Review medications for side effects contributing to fall risk. Reinforce activity limits and safety measures with patient and family.

## 2023-02-10 NOTE — ED PROVIDER NOTE - OBJECTIVE STATEMENT
77-year-old female with unknown past medical history brought in by EMS because she was knocking on neighbors door and acting erratically.  The patient was unable to give me the reason why she was knocking on the neighbors door. patient states that "you already know"  and states that she will not tell me anything else.  The patient denies having any medical history or taking any medications but then she also states that you can check the chart.  She would not give me her date of birth.  patient denies chest pain, shortness of breath, abdominal pain, nausea/vomiting/diarrhea.  Patient indicates vaguely that she has had some issues with her neighbors but would not elaborate.

## 2023-02-10 NOTE — ED BEHAVIORAL HEALTH ASSESSMENT NOTE - RISK ASSESSMENT
LOW RISK     ACUTE RISK FACTORS: untreated pain    CHRONIC RISK FACTORS: Multiple medical comorbidities    PROTECTIVE FACTORS: No suicide attempts, no violence history, medication compliance, no access to guns, no global insomnia, no substance abuse, supportive family, willingness to seek help, no suicidal ideation or homicidal ideation, hopefulness for future.

## 2023-02-10 NOTE — ED PROVIDER NOTE - PATIENT PORTAL LINK FT
You can access the FollowMyHealth Patient Portal offered by [facility] by registering at the following website: [email or web address]. By joining [employer]’s FollowMyHealth portal, you will also be able to view your health information using other applications (apps) compatible with our system.

## 2023-02-10 NOTE — ED ADULT TRIAGE NOTE - CHIEF COMPLAINT QUOTE
BIBA, pt found knocking on neighbors doors this AM and laughing. EMS found pt sitting on lawn. Neighbor thought pt was altered. Pt uncooperative in triage. Pt would not confirm D.O.B no ID to confirm.
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
## 2023-02-10 NOTE — ED PROVIDER NOTE - PROGRESS NOTE DETAILS
Jonathon BUNDY: patient had been received from Dr. Galo- seen by psych and cleared; patient did not provide urine sample and does not want to urinate at this time. Patient with chronic lower back pain; hx of herniated discs; no trauma or injury; d/w patient and family- to be evaluated by PT for rehab placement at this time. s/o to Dr. Price pending PT eval for rehab. CC:  Informed by Case Management that pt is accepted to [facility].

## 2023-02-10 NOTE — PHARMACOTHERAPY INTERVENTION NOTE - COMMENTS
Medication reconciliation completed.  Reviewed Medication list and confirmed med allergies with patient; confirmed with Dr. First Medlewis.

## 2023-02-10 NOTE — ED BEHAVIORAL HEALTH ASSESSMENT NOTE - DESCRIPTION
Vital Signs Last 24 Hrs  T(C): 36.8 (10 Feb 2023 10:56), Max: 37.2 (10 Feb 2023 03:52)  T(F): 98.2 (10 Feb 2023 10:56), Max: 99 (10 Feb 2023 03:52)  HR: 88 (10 Feb 2023 10:56) (76 - 94)  BP: 113/55 (10 Feb 2023 10:56) (101/58 - 151/84)  BP(mean): 64 (10 Feb 2023 10:56) (64 - 80)  RR: 18 (10 Feb 2023 10:56) (18 - 18)  SpO2: 96% (10 Feb 2023 10:56) (94% - 98%)    Parameters below as of 10 Feb 2023 10:56  Patient On (Oxygen Delivery Method): room air See HPI
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
## 2023-02-10 NOTE — ED BEHAVIORAL HEALTH ASSESSMENT NOTE - DETAILS
Patient instructed to return to the ED or call 911 if patient experiences SI, HI, hopelessness, worsening of symptoms or has any other concerns. Dr. Holt Denies

## 2023-02-10 NOTE — ED ADULT NURSE REASSESSMENT NOTE - NS ED NURSE REASSESS COMMENT FT1
patient ambulatory with walker and assistance with very unsteady gait. encouraged to use bedside commode

## 2023-02-10 NOTE — CHART NOTE - NSCHARTNOTEFT_GEN_A_CORE
Patient had expressly given Radha Torrez and [employer] permission to call her brother Dallin and discuss her condition. Her brother and his wife arrived at approximately 11:30 am. When they entered the patient's room she started screaming for us to get out. He sister in law left the room, but her brother remained and tried to calm her down. The patient stated that she wants more locks on the doors at home and does not want her brothers to come in. The patient has had several previous admissions. In the visit from July 22, she left AMA after the doctor asked her about psychiatric issues. Patient placed on 1-1 observation. Psych NP called for consult.

## 2023-02-12 LAB
CULTURE RESULTS: SIGNIFICANT CHANGE UP
SPECIMEN SOURCE: SIGNIFICANT CHANGE UP

## 2023-03-21 RX ORDER — NORTRIPTYLINE HYDROCHLORIDE 10 MG/1
2 CAPSULE ORAL
Qty: 0 | Refills: 0 | DISCHARGE

## 2023-03-21 RX ORDER — VITAMIN E 100 UNIT
1 CAPSULE ORAL
Qty: 0 | Refills: 0 | DISCHARGE

## 2023-03-21 RX ORDER — QUETIAPINE FUMARATE 200 MG/1
1 TABLET, FILM COATED ORAL
Qty: 0 | Refills: 0 | DISCHARGE

## 2023-03-21 RX ORDER — LAMOTRIGINE 25 MG/1
1 TABLET, ORALLY DISINTEGRATING ORAL
Qty: 0 | Refills: 0 | DISCHARGE

## 2023-03-21 RX ORDER — FLUDROCORTISONE ACETATE 0.1 MG/1
1 TABLET ORAL
Qty: 0 | Refills: 0 | DISCHARGE

## 2023-03-21 RX ORDER — MULTIVIT-MIN/FERROUS GLUCONATE 9 MG/15 ML
1 LIQUID (ML) ORAL
Qty: 0 | Refills: 0 | DISCHARGE

## 2023-03-21 RX ORDER — PREGABALIN 225 MG/1
1 CAPSULE ORAL
Qty: 0 | Refills: 0 | DISCHARGE

## 2023-03-21 RX ORDER — BETHANECHOL CHLORIDE 25 MG
1 TABLET ORAL
Qty: 0 | Refills: 0 | DISCHARGE

## 2023-03-21 RX ORDER — CLONAZEPAM 1 MG
0.5 TABLET ORAL
Qty: 0 | Refills: 0 | DISCHARGE

## 2023-03-21 NOTE — ED PROVIDER NOTE - CPE EDP RESP NORM
Hospitalized or in ED since last visit: No    Medication Changes: No    Upcoming/Recent Procedures: No    Referral Expiration Date Approaching: No    BP Readings from Last 1 Encounters:   01/23/23 124/76     
normal...
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
## 2023-04-28 PROBLEM — Z78.9 OTHER SPECIFIED HEALTH STATUS: Chronic | Status: ACTIVE | Noted: 2023-02-10

## 2023-05-04 ENCOUNTER — APPOINTMENT (OUTPATIENT)
Dept: CT IMAGING | Facility: CLINIC | Age: 73
End: 2023-05-04
Payer: MEDICARE

## 2023-05-04 ENCOUNTER — OUTPATIENT (OUTPATIENT)
Dept: OUTPATIENT SERVICES | Facility: HOSPITAL | Age: 73
LOS: 1 days | End: 2023-05-04
Payer: MEDICARE

## 2023-05-04 DIAGNOSIS — Z98.890 OTHER SPECIFIED POSTPROCEDURAL STATES: Chronic | ICD-10-CM

## 2023-05-04 DIAGNOSIS — Z96.7 PRESENCE OF OTHER BONE AND TENDON IMPLANTS: Chronic | ICD-10-CM

## 2023-05-04 DIAGNOSIS — Z00.8 ENCOUNTER FOR OTHER GENERAL EXAMINATION: ICD-10-CM

## 2023-05-04 PROCEDURE — 70450 CT HEAD/BRAIN W/O DYE: CPT | Mod: MH

## 2023-05-04 PROCEDURE — 70450 CT HEAD/BRAIN W/O DYE: CPT | Mod: 26,MH

## 2023-06-07 ENCOUNTER — APPOINTMENT (OUTPATIENT)
Dept: ORTHOPEDIC SURGERY | Facility: CLINIC | Age: 73
End: 2023-06-07
Payer: MEDICARE

## 2023-06-07 VITALS — WEIGHT: 130 LBS | HEIGHT: 65 IN | BODY MASS INDEX: 21.66 KG/M2

## 2023-06-07 DIAGNOSIS — M16.11 UNILATERAL PRIMARY OSTEOARTHRITIS, RIGHT HIP: ICD-10-CM

## 2023-06-07 DIAGNOSIS — M16.12 UNILATERAL PRIMARY OSTEOARTHRITIS, LEFT HIP: ICD-10-CM

## 2023-06-07 PROCEDURE — 73502 X-RAY EXAM HIP UNI 2-3 VIEWS: CPT

## 2023-06-07 PROCEDURE — 99213 OFFICE O/P EST LOW 20 MIN: CPT

## 2023-06-11 NOTE — REASON FOR VISIT
[facility] calls to update.  Pt declining since admission to [facility].  Dr. Oviedo on call 4/2/17 and gave order to admit pt to hospice services.  Pt admitted to hospice care 4/2/17.  Dr. Little notified.  
[FreeTextEntry2] : F/U B/L hips

## 2023-06-11 NOTE — HISTORY OF PRESENT ILLNESS
[10] : 10 [Sharp] : sharp [Constant] : constant [Meds] : meds [Walking] : walking [] : yes [FreeTextEntry1] : B/L Hips [FreeTextEntry5] : 71 y/o F presents for f/u eval. of B/L hips. Pt reports of increased pain levels especially in Lt. hip since last visit with no associated trauma.  [FreeTextEntry7] : down Lt. legs [FreeTextEntry9] : Tylenol and Gabapentin [de-identified] : 2021 [de-identified] : Dr. Knott

## 2023-06-11 NOTE — ASSESSMENT
[FreeTextEntry1] : The patient is a 71 yo woman presenting with bilateral hip soreness. The patient had b/l THAs on 5/26/2017. She reports having pain following having back treatment for sciatica. The patient was at [facility] in order to gain strength with ambulation. She is using a walker at this time for stability. She denies new trauma. She has had a history of falls and wished to protect against future falls. The patient is here to make sure both hip replacements are okay. \par \par B/L hip exam: The patient presents with no apparent distress. Neurovascularly intact. Sensation intact to the bilateral lower extremities. Operative incisions are well healed. No active drainage or discharge. +/ straight leg raise without pain. Tender to palpation to greater trochanters. Patient is ambulating with soreness. 4+/5 with strength.\par \par X-rays done in the office today bilateral hips 2 views and the AP pelvis 1 view show bilateral hip placements in good position and alignment with no signs of loosening or wear.  There are no fractures seen.  There are no obvious tumors, masses, calcifications seen.\par \par Plan at this time to continue her therapy for her lower back and I will see her back in the office as needed.\par

## 2023-08-15 ENCOUNTER — APPOINTMENT (OUTPATIENT)
Dept: UROLOGY | Facility: CLINIC | Age: 73
End: 2023-08-15
Payer: MEDICARE

## 2023-08-15 VITALS
HEART RATE: 73 BPM | DIASTOLIC BLOOD PRESSURE: 64 MMHG | RESPIRATION RATE: 14 BRPM | SYSTOLIC BLOOD PRESSURE: 113 MMHG | WEIGHT: 130 LBS | BODY MASS INDEX: 21.66 KG/M2 | OXYGEN SATURATION: 97 % | HEIGHT: 65 IN

## 2023-08-15 PROCEDURE — 99213 OFFICE O/P EST LOW 20 MIN: CPT

## 2023-08-15 NOTE — ADDENDUM
[FreeTextEntry1] : I, Nehemias Lopez, acted as a scribe on behalf of Dr. Nick Sol 08/15/2023. All medical entries made by the scribe were at my, Dr. Nick Sol's, direction and personally dictated by me on 08/15/2023. I have reviewed the chart and agree that the record accurately reflects my personal performance of the history, physical exam, assessment and plan. I have also personally directed, reviewed, and agreed with the chart.

## 2023-08-15 NOTE — HISTORY OF PRESENT ILLNESS
[FreeTextEntry1] : 72F presents today with a CC of urinary frequency. Pt returns for a refill on Bethanechol. She otherwise states that her urinary habitus has been stable. She notes no changes.

## 2023-08-15 NOTE — END OF VISIT
[FreeTextEntry3] : Urine is sent. Medications are refilled. Pt will follow up in 1 year or as needed.

## 2023-08-16 LAB
APPEARANCE: CLEAR
BACTERIA: NEGATIVE /HPF
BILIRUBIN URINE: NEGATIVE
BLOOD URINE: NEGATIVE
CAST: 0 /LPF
COLOR: YELLOW
EPITHELIAL CELLS: 0 /HPF
GLUCOSE QUALITATIVE U: NEGATIVE MG/DL
KETONES URINE: NEGATIVE MG/DL
LEUKOCYTE ESTERASE URINE: ABNORMAL
MICROSCOPIC-UA: NORMAL
NITRITE URINE: NEGATIVE
PH URINE: 6
PROTEIN URINE: NEGATIVE MG/DL
RED BLOOD CELLS URINE: 0 /HPF
SPECIFIC GRAVITY URINE: 1.01
UROBILINOGEN URINE: 0.2 MG/DL
WHITE BLOOD CELLS URINE: 2 /HPF

## 2023-08-18 LAB — URINE CYTOLOGY: NORMAL

## 2023-08-20 LAB — BACTERIA UR CULT: ABNORMAL

## 2023-12-12 ENCOUNTER — EMERGENCY (EMERGENCY)
Facility: HOSPITAL | Age: 73
LOS: 0 days | Discharge: HOME | End: 2023-12-12
Attending: EMERGENCY MEDICINE
Payer: MEDICARE

## 2023-12-12 VITALS
RESPIRATION RATE: 18 BRPM | TEMPERATURE: 98 F | OXYGEN SATURATION: 94 % | DIASTOLIC BLOOD PRESSURE: 74 MMHG | SYSTOLIC BLOOD PRESSURE: 142 MMHG | HEART RATE: 88 BPM

## 2023-12-12 VITALS — HEIGHT: 65 IN | WEIGHT: 119.93 LBS

## 2023-12-12 DIAGNOSIS — Z88.1 ALLERGY STATUS TO OTHER ANTIBIOTIC AGENTS STATUS: ICD-10-CM

## 2023-12-12 DIAGNOSIS — R19.7 DIARRHEA, UNSPECIFIED: ICD-10-CM

## 2023-12-12 DIAGNOSIS — M51.36 OTHER INTERVERTEBRAL DISC DEGENERATION, LUMBAR REGION: ICD-10-CM

## 2023-12-12 DIAGNOSIS — R15.2 FECAL URGENCY: ICD-10-CM

## 2023-12-12 DIAGNOSIS — Z96.7 PRESENCE OF OTHER BONE AND TENDON IMPLANTS: Chronic | ICD-10-CM

## 2023-12-12 DIAGNOSIS — Z95.0 PRESENCE OF CARDIAC PACEMAKER: ICD-10-CM

## 2023-12-12 DIAGNOSIS — I10 ESSENTIAL (PRIMARY) HYPERTENSION: ICD-10-CM

## 2023-12-12 DIAGNOSIS — F41.9 ANXIETY DISORDER, UNSPECIFIED: ICD-10-CM

## 2023-12-12 DIAGNOSIS — E11.9 TYPE 2 DIABETES MELLITUS WITHOUT COMPLICATIONS: ICD-10-CM

## 2023-12-12 DIAGNOSIS — G89.29 OTHER CHRONIC PAIN: ICD-10-CM

## 2023-12-12 DIAGNOSIS — Z98.890 OTHER SPECIFIED POSTPROCEDURAL STATES: Chronic | ICD-10-CM

## 2023-12-12 PROCEDURE — 99284 EMERGENCY DEPT VISIT MOD MDM: CPT | Mod: FS

## 2023-12-12 PROCEDURE — 72131 CT LUMBAR SPINE W/O DYE: CPT | Mod: MA

## 2023-12-12 PROCEDURE — 99284 EMERGENCY DEPT VISIT MOD MDM: CPT | Mod: 25

## 2023-12-12 PROCEDURE — 72131 CT LUMBAR SPINE W/O DYE: CPT | Mod: 26,MA

## 2023-12-12 NOTE — ED ADULT NURSE NOTE - NSFALLRISKINTERV_ED_ALL_ED
Assistance OOB with selected safe patient handling equipment if applicable/Assistance with ambulation/Communicate fall risk and risk factors to all staff, patient, and family/Monitor gait and stability/Provide patient with walking aids/Provide visual cue: yellow wristband, yellow gown, etc/Reinforce activity limits and safety measures with patient and family/Toileting schedule using arm’s reach rule for commode and bathroom/Call bell, personal items and telephone in reach/Instruct patient to call for assistance before getting out of bed/chair/stretcher/Non-slip footwear applied when patient is off stretcher/[place] to call system/Physically safe environment - no spills, clutter or unnecessary equipment/Purposeful Proactive Rounding/Room/bathroom lighting operational, light cord in reach Assistance OOB with selected safe patient handling equipment if applicable/Assistance with ambulation/Communicate fall risk and risk factors to all staff, patient, and family/Monitor gait and stability/Provide patient with walking aids/Provide visual cue: yellow wristband, yellow gown, etc/Reinforce activity limits and safety measures with patient and family/Toileting schedule using arm’s reach rule for commode and bathroom/Call bell, personal items and telephone in reach/Instruct patient to call for assistance before getting out of bed/chair/stretcher/Non-slip footwear applied when patient is off stretcher/[place] to call system/Physically safe environment - no spills, clutter or unnecessary equipment/Purposeful Proactive Rounding/Room/bathroom lighting operational, light cord in reach

## 2023-12-12 NOTE — ED STATDOCS - CARE PROVIDER_API CALL
Edward Vieira  Internal Medicine  [address], [address]  [place] [address]  Phone: [phone]  Fax: [phone]  Established Patient  Follow Up Time:     Colt Garcia  [address], [address]  [place] [address]  Phone: [phone]  Fax: [phone]  Established Patient  Follow Up Time:    Edward Vieira  Internal Medicine  [address], [address]  [place] [address]  Phone: [phone]  Fax: [phone]  Established Patient  Follow Up Time:     Colt Garcia  [address], [address]  [place] [address]  Phone: [phone]  Fax: [phone]  Established Patient  Follow Up Time:

## 2023-12-12 NOTE — ED ADULT TRIAGE NOTE - CHIEF COMPLAINT QUOTE
Pt A&OX3, presenting to the ER with c/o bowel urgency. Pt reports being followed by a spine specialist for several months now. On Friday had an injection and on Saturday began having bowel urgency. MD sent her in to have a CT-scan.   Denies CP, SOB, fevers.

## 2023-12-12 NOTE — ED STATDOCS - OBJECTIVE STATEMENT
71 y/o F with PMHx of TBI, arrhythmia, pacemaker, DM, anxiety, HTN, heart murmur, depression presents A&OX3 to the ER with c/o bowel urgency. Pt reports being followed by a spine specialist for several months now. On Friday had an injection and on Saturday began having bowel urgency. MD sent her in to have a CT-scan. Denies CP, SOB, fevers. Nonsmoker. Pt on Gabapentin and Tylenol. Pt endorses stool being looser than usual.

## 2023-12-12 NOTE — ED STATDOCS - GASTROINTESTINAL, MLM
abdomen soft, non-tender, and non-distended. Bowel sounds present. rectal exam with Erin Burton as chaperone. no numbness, good rectal tone, no stool in vault

## 2023-12-12 NOTE — ED STATDOCS - PATIENT PORTAL LINK FT
You can access the FollowMyHealth Patient Portal offered by [facility] by registering at the following website: [email or web address]. By joining [employer]’s FollowMyHealth portal, you will also be able to view your health information using other applications (apps) compatible with our system. You can access the FollowMyHealth Patient Portal offered by [facility] by registering at the following website: [email or web address]. By joining [employer]’s FollowMyHealth portal, you will also be able to view your health information using other applications (apps) compatible with our system.

## 2023-12-12 NOTE — ED STATDOCS - NSICDXPASTMEDICALHX_GEN_ALL_CORE_FT
PAST MEDICAL HISTORY:  Anxiety     Arrhythmia pacemaker placed in 2011    Bell's palsy     Clinical Depression     Constipation     Hearing loss slight    Heart murmur     HTN (hypertension)     Lumbar stenosis     Lyme Disease     No pertinent past medical history     Osteoarthritis of both hips     Pacemaker     Sleep apnea uses mouthpiece    TBI (traumatic brain injury) personal history of TBI    Urine retention

## 2023-12-12 NOTE — ED STATDOCS - CARE PLAN
Principal Discharge DX:	Fecal urgency  Secondary Diagnosis:	Chronic back pain  Secondary Diagnosis:	Lumbar degenerative disc disease   1

## 2023-12-12 NOTE — ED STATDOCS - PROVIDER TOKENS
Statement Selected PROVIDER:[TOKEN:[[id number]],ESTABLISHEDPATIENT:[T]],PROVIDER:[TOKEN:[[id number]],ESTABLISHEDPATIENT:[T]] PROVIDER:[TOKEN:[[id number]],ESTABLISHEDPATIENT:[T]],PROVIDER:[TOKEN:[[id number]],ESTABLISHEDPATIENT:[T]]

## 2023-12-12 NOTE — ED ADULT NURSE NOTE - OBJECTIVE STATEMENT
SI joint and left buttock pain with bowel urgency following injection into SI joint on Friday December 8, 2023 .  Referred to ED by Spinal doctor Jose to check for spinal compression as per patient.

## 2023-12-12 NOTE — ED STATDOCS - NSICDXPASTSURGICALHX_GEN_ALL_CORE_FT
PAST SURGICAL HISTORY:  H/O shoulder surgery fanta procedure - 1986    History of Tonsillectomy age 22    No significant past surgical history     S/P Arthroscopy of Left Knee 1990's    S/P Laparoscopic Cholecystectomy 15 yrs ago    S/P ORIF (open reduction internal fixation) fracture right leg - 2011    S/P Rhinoplasty 1970's

## 2023-12-12 NOTE — ED STATDOCS - ATTENDING APP SHARED VISIT CONTRIBUTION OF CARE
I, Chasidy Chiu MD,  performed the initial face to face bedside interview with this patient regarding history of present illness, review of symptoms and relevant past medical, social and family history.  I completed an independent physical examination.  I was the initial provider who evaluated this patient.   I personally saw the patient and performed a substantive portion of the visit including all aspects of the medical decision making.  I have signed out the follow up of any pending tests (i.e. labs, radiological studies) to the ОЕЛГ.  I have communicated the patient’s plan of care and disposition with the ОЛЕГ.  The history, relevant review of systems, past medical and surgical history, medical decision making, and physical examination was documented by the scribe in my presence and I attest to the accuracy of the documentation. I, Chasidy Chiu MD,  performed the initial face to face bedside interview with this patient regarding history of present illness, review of symptoms and relevant past medical, social and family history.  I completed an independent physical examination.  I was the initial provider who evaluated this patient.   I personally saw the patient and performed a substantive portion of the visit including all aspects of the medical decision making.  I have signed out the follow up of any pending tests (i.e. labs, radiological studies) to the ОЛЕГ.  I have communicated the patient’s plan of care and disposition with the ОЛЕГ.  The history, relevant review of systems, past medical and surgical history, medical decision making, and physical examination was documented by the scribe in my presence and I attest to the accuracy of the documentation.

## 2023-12-12 NOTE — ED STATDOCS - CLINICAL SUMMARY MEDICAL DECISION MAKING FREE TEXT BOX
71 y/o pt with loose stools, bowel urgency. Pt recent injection. Sent to ED for eval. Plan to get CT lumbar spine. 73 y/o pt with loose stools, bowel urgency. Pt recent injection. Sent to ED for eval. Plan to get CT lumbar spine.

## 2023-12-27 ENCOUNTER — APPOINTMENT (OUTPATIENT)
Dept: UROLOGY | Facility: CLINIC | Age: 73
End: 2023-12-27
Payer: MEDICARE

## 2023-12-27 VITALS
SYSTOLIC BLOOD PRESSURE: 139 MMHG | RESPIRATION RATE: 16 BRPM | WEIGHT: 117 LBS | HEIGHT: 65 IN | OXYGEN SATURATION: 96 % | HEART RATE: 91 BPM | BODY MASS INDEX: 19.49 KG/M2 | DIASTOLIC BLOOD PRESSURE: 64 MMHG

## 2023-12-27 DIAGNOSIS — R35.0 FREQUENCY OF MICTURITION: ICD-10-CM

## 2023-12-27 PROCEDURE — 99213 OFFICE O/P EST LOW 20 MIN: CPT

## 2023-12-27 NOTE — ASSESSMENT
[FreeTextEntry1] : New onset of urinary frequency.  A US of the kidneys and bladder is ordered. Urine is sent for culture analysis and cytology. She will continue her current medications and followup in 1 month.

## 2023-12-27 NOTE — HISTORY OF PRESENT ILLNESS
[FreeTextEntry1] : 73F presents today with a CC of urinary frequency. Pt is on Bethanechol for poor bladder emptying. Her PVR today was about 100cc. She is having other medical issues at this time.

## 2023-12-27 NOTE — END OF VISIT
[FreeTextEntry3] : All medical record entries made by the Scribe were at my, Dr. Nick Sol's, direction and personally dictated by me on 12/27/2023. I have reviewed the chart and agree that the record accurately reflects my personal performance of the history, physical exam, assessment and plan. I have also personally directed, reviewed, and agreed with the chart.

## 2023-12-28 ENCOUNTER — APPOINTMENT (OUTPATIENT)
Dept: ORTHOPEDIC SURGERY | Facility: CLINIC | Age: 73
End: 2023-12-28

## 2023-12-28 LAB
APPEARANCE: CLEAR
BACTERIA: NEGATIVE /HPF
BILIRUBIN URINE: NEGATIVE
BLOOD URINE: ABNORMAL
CAST: 0 /LPF
COLOR: YELLOW
EPITHELIAL CELLS: 0 /HPF
GLUCOSE QUALITATIVE U: >=1000 MG/DL
KETONES URINE: NEGATIVE MG/DL
LEUKOCYTE ESTERASE URINE: NEGATIVE
MICROSCOPIC-UA: NORMAL
NITRITE URINE: NEGATIVE
PH URINE: 6
PROTEIN URINE: NEGATIVE MG/DL
RED BLOOD CELLS URINE: 2 /HPF
SPECIFIC GRAVITY URINE: >1.03
UROBILINOGEN URINE: 0.2 MG/DL
WHITE BLOOD CELLS URINE: 0 /HPF

## 2023-12-29 LAB
BACTERIA UR CULT: NORMAL
URINE CYTOLOGY: NORMAL

## 2024-01-15 ENCOUNTER — OUTPATIENT (OUTPATIENT)
Dept: OUTPATIENT SERVICES | Facility: HOSPITAL | Age: 74
LOS: 1 days | End: 2024-01-15
Payer: MEDICARE

## 2024-01-15 ENCOUNTER — APPOINTMENT (OUTPATIENT)
Dept: CT IMAGING | Facility: CLINIC | Age: 74
End: 2024-01-15
Payer: MEDICARE

## 2024-01-15 DIAGNOSIS — R63.4 ABNORMAL WEIGHT LOSS: ICD-10-CM

## 2024-01-15 DIAGNOSIS — Z98.890 OTHER SPECIFIED POSTPROCEDURAL STATES: Chronic | ICD-10-CM

## 2024-01-15 DIAGNOSIS — Z96.7 PRESENCE OF OTHER BONE AND TENDON IMPLANTS: Chronic | ICD-10-CM

## 2024-01-15 PROCEDURE — 74177 CT ABD & PELVIS W/CONTRAST: CPT | Mod: MH

## 2024-01-15 PROCEDURE — 74177 CT ABD & PELVIS W/CONTRAST: CPT | Mod: 26,MH

## 2024-01-22 ENCOUNTER — APPOINTMENT (OUTPATIENT)
Dept: ULTRASOUND IMAGING | Facility: CLINIC | Age: 74
End: 2024-01-22
Payer: MEDICARE

## 2024-01-22 ENCOUNTER — OUTPATIENT (OUTPATIENT)
Dept: OUTPATIENT SERVICES | Facility: HOSPITAL | Age: 74
LOS: 1 days | End: 2024-01-22
Payer: MEDICARE

## 2024-01-22 DIAGNOSIS — Z96.7 PRESENCE OF OTHER BONE AND TENDON IMPLANTS: Chronic | ICD-10-CM

## 2024-01-22 DIAGNOSIS — R35.0 FREQUENCY OF MICTURITION: ICD-10-CM

## 2024-01-22 DIAGNOSIS — Z98.890 OTHER SPECIFIED POSTPROCEDURAL STATES: Chronic | ICD-10-CM

## 2024-01-22 PROCEDURE — 76770 US EXAM ABDO BACK WALL COMP: CPT | Mod: 26

## 2024-01-22 PROCEDURE — 76770 US EXAM ABDO BACK WALL COMP: CPT

## 2024-01-31 ENCOUNTER — APPOINTMENT (OUTPATIENT)
Dept: UROLOGY | Facility: CLINIC | Age: 74
End: 2024-01-31

## 2024-02-19 ENCOUNTER — APPOINTMENT (OUTPATIENT)
Dept: ORTHOPEDIC SURGERY | Facility: CLINIC | Age: 74
End: 2024-02-19
Payer: MEDICARE

## 2024-02-19 VITALS — BODY MASS INDEX: 19.49 KG/M2 | HEIGHT: 65 IN | WEIGHT: 117 LBS

## 2024-02-19 DIAGNOSIS — S82.201A UNSPECIFIED FRACTURE OF SHAFT OF RIGHT TIBIA, INITIAL ENCOUNTER FOR CLOSED FRACTURE: ICD-10-CM

## 2024-02-19 DIAGNOSIS — S82.201S UNSPECIFIED FRACTURE OF SHAFT OF RIGHT TIBIA, SEQUELA: ICD-10-CM

## 2024-02-19 DIAGNOSIS — S82.401S UNSPECIFIED FRACTURE OF SHAFT OF RIGHT TIBIA, SEQUELA: ICD-10-CM

## 2024-02-19 PROCEDURE — 73590 X-RAY EXAM OF LOWER LEG: CPT | Mod: RT

## 2024-02-19 PROCEDURE — 99213 OFFICE O/P EST LOW 20 MIN: CPT

## 2024-02-19 NOTE — ASSESSMENT
[FreeTextEntry1] : Patient comes in today for new problem this time regarding her right tibia and fibula.  She had an open fracture in 2011 treated at [facility] with an intramedullary nail.  She has had intermittent pain in the right leg on and off for years.  She had a proximal screws removed several years ago.  Over the last 6 months or so she has had some discomfort with walking.  She has some pain with stairs.  She denies any night pain.  Pain is over the fracture site.  She denies any swelling or redness.  She denies any injury.  Examination of the right lower extremity reveals normal neurovascular exam.  Examination of the right leg reveals well-healed scars around the knee and ankle.  She has evidence of a scar over the fracture site from the open area.  There is some discoloration there but no signs of redness or swelling.  There is some pain at that site.  There is no movement at the fracture.  Exam of her right ankle and right knee are normal.  X-rays done in the office today of the right tibia and fibula 2 views show the intramedullary nail in the right tibia.  Her proximal screws have been removed.  The distal screws are still present.  The fracture is completely healed with good remodeling.  There is no obvious fracture or tumor seen.  Plan at this time is to get a CT scan of the right tibia and fibula to make sure there is no malignancy or breakdown at the fracture site.  Will decide on a course of action following the study.

## 2024-02-19 NOTE — HISTORY OF PRESENT ILLNESS
[6] : 6 [Sharp] : sharp [Constant] : constant [Meds] : meds [Walking] : walking [] : yes [FreeTextEntry1] : B/L Hips [FreeTextEntry5] : 71 y/o F presents for f/u eval. of B/L hips. Pt states hips are feeling great. She is here for her right lower leg today. right tib/fib [FreeTextEntry7] : down Lt. legs [FreeTextEntry9] : Tylenol and Gabapentin [de-identified] : 2021 [de-identified] : Dr. Knott

## 2024-02-21 ENCOUNTER — APPOINTMENT (OUTPATIENT)
Dept: CT IMAGING | Facility: CLINIC | Age: 74
End: 2024-02-21
Payer: MEDICARE

## 2024-02-21 ENCOUNTER — RESULT REVIEW (OUTPATIENT)
Age: 74
End: 2024-02-21

## 2024-02-21 ENCOUNTER — OUTPATIENT (OUTPATIENT)
Dept: OUTPATIENT SERVICES | Facility: HOSPITAL | Age: 74
LOS: 1 days | End: 2024-02-21
Payer: MEDICARE

## 2024-02-21 DIAGNOSIS — Z98.890 OTHER SPECIFIED POSTPROCEDURAL STATES: Chronic | ICD-10-CM

## 2024-02-21 DIAGNOSIS — Z96.7 PRESENCE OF OTHER BONE AND TENDON IMPLANTS: Chronic | ICD-10-CM

## 2024-02-21 DIAGNOSIS — S82.201S UNSPECIFIED FRACTURE OF SHAFT OF RIGHT TIBIA, SEQUELA: ICD-10-CM

## 2024-02-21 PROCEDURE — 73700 CT LOWER EXTREMITY W/O DYE: CPT | Mod: MA

## 2024-02-21 PROCEDURE — 73700 CT LOWER EXTREMITY W/O DYE: CPT | Mod: 26,RT,MA

## 2024-02-21 PROCEDURE — 76376 3D RENDER W/INTRP POSTPROCES: CPT

## 2024-02-21 PROCEDURE — 76376 3D RENDER W/INTRP POSTPROCES: CPT | Mod: 26

## 2024-02-28 ENCOUNTER — NON-APPOINTMENT (OUTPATIENT)
Age: 74
End: 2024-02-28

## 2024-03-04 ENCOUNTER — APPOINTMENT (OUTPATIENT)
Dept: UROLOGY | Facility: CLINIC | Age: 74
End: 2024-03-04

## 2024-03-07 ENCOUNTER — APPOINTMENT (OUTPATIENT)
Dept: OTOLARYNGOLOGY | Facility: CLINIC | Age: 74
End: 2024-03-07
Payer: MEDICARE

## 2024-03-07 ENCOUNTER — RX RENEWAL (OUTPATIENT)
Age: 74
End: 2024-03-07

## 2024-03-07 VITALS — HEIGHT: 65 IN | WEIGHT: 123 LBS | BODY MASS INDEX: 20.49 KG/M2

## 2024-03-07 DIAGNOSIS — H69.93 UNSPECIFIED EUSTACHIAN TUBE DISORDER, BILATERAL: ICD-10-CM

## 2024-03-07 DIAGNOSIS — H61.23 IMPACTED CERUMEN, BILATERAL: ICD-10-CM

## 2024-03-07 PROCEDURE — 92557 COMPREHENSIVE HEARING TEST: CPT

## 2024-03-07 PROCEDURE — 99214 OFFICE O/P EST MOD 30 MIN: CPT | Mod: 25

## 2024-03-07 PROCEDURE — 92567 TYMPANOMETRY: CPT

## 2024-03-07 PROCEDURE — 99204 OFFICE O/P NEW MOD 45 MIN: CPT | Mod: 25

## 2024-03-07 PROCEDURE — 31231 NASAL ENDOSCOPY DX: CPT

## 2024-03-07 PROCEDURE — 69210 REMOVE IMPACTED EAR WAX UNI: CPT

## 2024-03-07 RX ORDER — FLUTICASONE PROPIONATE 50 UG/1
50 SPRAY, METERED NASAL
Qty: 48 | Refills: 0 | Status: ACTIVE | COMMUNITY
Start: 2024-03-07 | End: 1900-01-01

## 2024-03-07 NOTE — ASSESSMENT
[FreeTextEntry1] : sinus pressure deviated septum cerumen cleared audio au loss [id number] hz symmetric source of tinnitus Noise or tinnitus can be masked with external sounds such as white noise.  Fans or ac at night can help or the use of a sound generating caroline or device. Hearing aids if indicated can have a masking function programed in. trial fluticasone spray

## 2024-03-07 NOTE — PHYSICAL EXAM
[Nasal Endoscopy Performed] : nasal endoscopy was performed, see procedure section for findings [] : septum deviated to the right [Normal] : salivary glands are normal [de-identified] : zane

## 2024-03-07 NOTE — HISTORY OF PRESENT ILLNESS
[de-identified] : co hiss au question of hearing loss no nasal obstruction co excessive drip clear eyebrow pressure septum and rhino mqany y ago

## 2024-03-07 NOTE — PROCEDURE
[FreeTextEntry6] : Indication for procedure:  Unable to adequately examine mid and posterior nasal cavity with anterior rhinoscopy The patient has facia pressure excessive clear drip  Sinus endoscope # 99 Nasal septum exam shows septal deviation to the rt at valve, adhesion to m turb The inferior nasal turbinates are moderately hypertrophic in size bilaterally. The sinus endoscope was introduced into the right nares exam right middle meatus reveals no mucopus or inflammation.  The right middle turbinate is WNL. The scope was advanced and the sphenoethmoid region was inspected. The superior meatus, superior turbinate and nasal vault are unremarkable.  The nasopharynx is unremarkable without inflammation or mass. The sinus endoscope was introduced into the left nares and exam left middle meatus reveals no mucopus or inflammation.  The left middle turbinate is WNL. The scope was advanced and the sphenoethmoid region was inspected. The left superior meatus and nasal vault are unremarkable.

## 2024-03-29 ENCOUNTER — APPOINTMENT (OUTPATIENT)
Dept: UROLOGY | Facility: CLINIC | Age: 74
End: 2024-03-29
Payer: MEDICARE

## 2024-03-29 VITALS
WEIGHT: 127 LBS | SYSTOLIC BLOOD PRESSURE: 95 MMHG | DIASTOLIC BLOOD PRESSURE: 61 MMHG | HEART RATE: 89 BPM | BODY MASS INDEX: 21.16 KG/M2 | OXYGEN SATURATION: 94 % | RESPIRATION RATE: 16 BRPM | HEIGHT: 65 IN

## 2024-03-29 DIAGNOSIS — N31.2 FLACCID NEUROPATHIC BLADDER, NOT ELSEWHERE CLASSIFIED: ICD-10-CM

## 2024-03-29 PROCEDURE — 51729 CYSTOMETROGRAM W/VP&UP: CPT

## 2024-03-29 PROCEDURE — 51784 ANAL/URINARY MUSCLE STUDY: CPT

## 2024-03-29 PROCEDURE — 51797 INTRAABDOMINAL PRESSURE TEST: CPT

## 2024-04-01 NOTE — PROGRESS NOTE ADULT - PROVIDER SPECIALTY LIST ADULT
Hospitalist PROCEDURES:  Biopsy or excision, deep lymph node, axillary 01-Apr-2024 13:11:48 left Fernanda Luo  Identification, lymph node, sentinel, intraoperative, using dye injection 01-Apr-2024 13:12:03  Fernanda Luo

## 2024-04-02 ENCOUNTER — APPOINTMENT (OUTPATIENT)
Dept: OTOLARYNGOLOGY | Facility: CLINIC | Age: 74
End: 2024-04-02
Payer: MEDICARE

## 2024-04-02 VITALS — WEIGHT: 127 LBS | HEIGHT: 65 IN | BODY MASS INDEX: 21.16 KG/M2

## 2024-04-02 DIAGNOSIS — H93.11 TINNITUS, RIGHT EAR: ICD-10-CM

## 2024-04-02 DIAGNOSIS — J30.9 ALLERGIC RHINITIS, UNSPECIFIED: ICD-10-CM

## 2024-04-02 DIAGNOSIS — H90.3 SENSORINEURAL HEARING LOSS, BILATERAL: ICD-10-CM

## 2024-04-02 DIAGNOSIS — R05.3 CHRONIC COUGH: ICD-10-CM

## 2024-04-02 PROCEDURE — 99213 OFFICE O/P EST LOW 20 MIN: CPT

## 2024-04-02 RX ORDER — IPRATROPIUM BROMIDE 42 UG/1
0.06 SPRAY NASAL
Qty: 15 | Refills: 5 | Status: ACTIVE | COMMUNITY
Start: 2024-04-02 | End: 1900-01-01

## 2024-04-02 NOTE — REASON FOR VISIT
[Subsequent Evaluation] : a subsequent evaluation for [FreeTextEntry2] : hearing/ ear issues / post nasal drip

## 2024-04-02 NOTE — REVIEW OF SYSTEMS
[Negative] : Ear [Patient Intake Form Reviewed] : Patient intake form was reviewed [de-identified] : hissing in ear, tinnitus

## 2024-04-23 ENCOUNTER — APPOINTMENT (OUTPATIENT)
Dept: PHARMACY | Facility: CLINIC | Age: 74
End: 2024-04-23
Payer: SELF-PAY

## 2024-04-23 PROCEDURE — V5010 ASSESSMENT FOR HEARING AID: CPT | Mod: NC

## 2024-05-14 ENCOUNTER — APPOINTMENT (OUTPATIENT)
Dept: PHARMACY | Facility: CLINIC | Age: 74
End: 2024-05-14
Payer: SELF-PAY

## 2024-05-14 PROCEDURE — V5261F: CUSTOM

## 2024-06-04 ENCOUNTER — APPOINTMENT (OUTPATIENT)
Dept: PHARMACY | Facility: CLINIC | Age: 74
End: 2024-06-04
Payer: MEDICARE

## 2024-06-04 PROCEDURE — V5299A: CUSTOM | Mod: NC

## 2024-06-14 ENCOUNTER — APPOINTMENT (OUTPATIENT)
Dept: NEUROLOGY | Facility: CLINIC | Age: 74
End: 2024-06-14

## 2024-06-24 ENCOUNTER — APPOINTMENT (OUTPATIENT)
Dept: ORTHOPEDIC SURGERY | Facility: CLINIC | Age: 74
End: 2024-06-24
Payer: MEDICARE

## 2024-06-24 VITALS — WEIGHT: 130 LBS | BODY MASS INDEX: 21.66 KG/M2 | HEIGHT: 65 IN

## 2024-06-24 DIAGNOSIS — M17.12 UNILATERAL PRIMARY OSTEOARTHRITIS, LEFT KNEE: ICD-10-CM

## 2024-06-24 DIAGNOSIS — M17.11 UNILATERAL PRIMARY OSTEOARTHRITIS, RIGHT KNEE: ICD-10-CM

## 2024-06-24 PROCEDURE — 20610 DRAIN/INJ JOINT/BURSA W/O US: CPT | Mod: 50

## 2024-06-24 PROCEDURE — 99213 OFFICE O/P EST LOW 20 MIN: CPT | Mod: 25

## 2024-06-24 PROCEDURE — 73564 X-RAY EXAM KNEE 4 OR MORE: CPT | Mod: 50

## 2024-07-09 ENCOUNTER — APPOINTMENT (OUTPATIENT)
Dept: PHARMACY | Facility: CLINIC | Age: 74
End: 2024-07-09

## 2024-08-15 ENCOUNTER — APPOINTMENT (OUTPATIENT)
Dept: PHARMACY | Facility: CLINIC | Age: 74
End: 2024-08-15

## 2024-08-15 PROCEDURE — V5299A: CUSTOM

## 2024-09-13 ENCOUNTER — NON-APPOINTMENT (OUTPATIENT)
Age: 74
End: 2024-09-13

## 2024-11-18 NOTE — ED PROVIDER NOTE - MDM ORDERS SUBMITTED SELECTION
Caller: Judy Koehler    Relationship: Self    Best call back number:     What is the best time to reach you:     Who are you requesting to speak with (clinical staff, provider,  specific staff member):     Do you know the name of the person who called:     What was the call regarding: PATIENT SAYS THAT HER URINE TEST RESULTS SHOW THAT ITS STILL ABNORMAL AND SHE WANTS TO KNOW IF MORE MEDICATION WILL BE CALLED IN FOR HER.       Is it okay if the provider responds through MyChart:        Labs/EKG/Imaging Studies/Medications

## 2024-12-17 ENCOUNTER — APPOINTMENT (OUTPATIENT)
Facility: CLINIC | Age: 74
End: 2024-12-17
Payer: MEDICARE

## 2024-12-17 DIAGNOSIS — M17.11 UNILATERAL PRIMARY OSTEOARTHRITIS, RIGHT KNEE: ICD-10-CM

## 2024-12-17 PROCEDURE — 20611 DRAIN/INJ JOINT/BURSA W/US: CPT | Mod: RT

## 2024-12-17 PROCEDURE — 99213 OFFICE O/P EST LOW 20 MIN: CPT | Mod: 25

## 2024-12-17 PROCEDURE — 73564 X-RAY EXAM KNEE 4 OR MORE: CPT | Mod: RT

## 2025-01-23 ENCOUNTER — APPOINTMENT (OUTPATIENT)
Facility: CLINIC | Age: 75
End: 2025-01-23
Payer: MEDICARE

## 2025-01-23 VITALS — HEIGHT: 65 IN | WEIGHT: 135 LBS | BODY MASS INDEX: 22.49 KG/M2

## 2025-01-23 DIAGNOSIS — M17.11 UNILATERAL PRIMARY OSTEOARTHRITIS, RIGHT KNEE: ICD-10-CM

## 2025-01-23 PROCEDURE — 20611 DRAIN/INJ JOINT/BURSA W/US: CPT | Mod: RT

## 2025-01-23 PROCEDURE — 99213 OFFICE O/P EST LOW 20 MIN: CPT | Mod: 25

## 2025-01-23 RX ORDER — ROSUVASTATIN CALCIUM 5 MG/1
5 TABLET, FILM COATED ORAL
Refills: 0 | Status: ACTIVE | COMMUNITY

## 2025-01-23 RX ORDER — SITAGLIPTIN 100 MG/1
100 TABLET, FILM COATED ORAL
Refills: 0 | Status: ACTIVE | COMMUNITY

## 2025-03-30 ENCOUNTER — NON-APPOINTMENT (OUTPATIENT)
Age: 75
End: 2025-03-30

## 2025-05-26 ENCOUNTER — NON-APPOINTMENT (OUTPATIENT)
Age: 75
End: 2025-05-26

## 2025-05-27 ENCOUNTER — APPOINTMENT (OUTPATIENT)
Dept: UROLOGY | Facility: CLINIC | Age: 75
End: 2025-05-27
Payer: MEDICARE

## 2025-05-27 VITALS
WEIGHT: 150 LBS | DIASTOLIC BLOOD PRESSURE: 68 MMHG | HEIGHT: 65 IN | TEMPERATURE: 97.8 F | OXYGEN SATURATION: 93 % | RESPIRATION RATE: 14 BRPM | SYSTOLIC BLOOD PRESSURE: 107 MMHG | HEART RATE: 94 BPM | BODY MASS INDEX: 24.99 KG/M2

## 2025-05-27 DIAGNOSIS — N31.2 FLACCID NEUROPATHIC BLADDER, NOT ELSEWHERE CLASSIFIED: ICD-10-CM

## 2025-05-27 PROCEDURE — 99212 OFFICE O/P EST SF 10 MIN: CPT

## 2025-07-16 ENCOUNTER — APPOINTMENT (OUTPATIENT)
Facility: CLINIC | Age: 75
End: 2025-07-16
Payer: MEDICARE

## 2025-07-16 VITALS — BODY MASS INDEX: 24.99 KG/M2 | WEIGHT: 150 LBS | HEIGHT: 65 IN

## 2025-07-16 PROCEDURE — 99213 OFFICE O/P EST LOW 20 MIN: CPT | Mod: 25

## 2025-07-16 PROCEDURE — J3490M: CUSTOM | Mod: NC,JZ

## 2025-07-16 PROCEDURE — 20611 DRAIN/INJ JOINT/BURSA W/US: CPT | Mod: RT

## 2025-07-16 RX ORDER — ESCITALOPRAM OXALATE 20 MG/1
20 TABLET ORAL
Refills: 0 | Status: ACTIVE | COMMUNITY